# Patient Record
Sex: FEMALE | Race: OTHER | Employment: OTHER | URBAN - METROPOLITAN AREA
[De-identification: names, ages, dates, MRNs, and addresses within clinical notes are randomized per-mention and may not be internally consistent; named-entity substitution may affect disease eponyms.]

---

## 2018-10-24 ENCOUNTER — HOSPITAL ENCOUNTER (OUTPATIENT)
Dept: NUCLEAR MEDICINE | Age: 68
Discharge: HOME | End: 2018-10-26
Payer: MEDICARE

## 2018-10-24 ENCOUNTER — HOSPITAL ENCOUNTER (OUTPATIENT)
Dept: NON INVASIVE DIAGNOSTICS | Age: 68
Discharge: HOME | End: 2018-10-24
Payer: MEDICARE

## 2018-10-24 DIAGNOSIS — R07.2 PRECORDIAL PAIN: ICD-10-CM

## 2018-10-24 LAB
LV EF: 70 %
LVEF MODALITY: NORMAL

## 2018-10-24 PROCEDURE — 93017 CV STRESS TEST TRACING ONLY: CPT

## 2018-10-24 PROCEDURE — 2580000003 HC RX 258: Performed by: FAMILY MEDICINE

## 2018-10-24 PROCEDURE — 2580000003 HC RX 258: Performed by: INTERNAL MEDICINE

## 2018-10-24 PROCEDURE — 3430000000 HC RX DIAGNOSTIC RADIOPHARMACEUTICAL: Performed by: FAMILY MEDICINE

## 2018-10-24 PROCEDURE — A9500 TC99M SESTAMIBI: HCPCS | Performed by: FAMILY MEDICINE

## 2018-10-24 PROCEDURE — 78452 HT MUSCLE IMAGE SPECT MULT: CPT

## 2018-10-24 PROCEDURE — 6360000002 HC RX W HCPCS: Performed by: INTERNAL MEDICINE

## 2018-10-24 RX ORDER — SODIUM CHLORIDE 0.9 % (FLUSH) 0.9 %
10 SYRINGE (ML) INJECTION PRN
Status: DISCONTINUED | OUTPATIENT
Start: 2018-10-24 | End: 2018-10-24

## 2018-10-24 RX ORDER — NITROGLYCERIN 0.4 MG/1
0.4 TABLET SUBLINGUAL EVERY 5 MIN PRN
Status: DISCONTINUED | OUTPATIENT
Start: 2018-10-24 | End: 2018-10-24

## 2018-10-24 RX ORDER — HYDRALAZINE HYDROCHLORIDE 20 MG/ML
10 INJECTION INTRAMUSCULAR; INTRAVENOUS EVERY 6 HOURS PRN
Status: DISCONTINUED | OUTPATIENT
Start: 2018-10-24 | End: 2018-10-27 | Stop reason: HOSPADM

## 2018-10-24 RX ORDER — HYDRALAZINE HYDROCHLORIDE 20 MG/ML
5 INJECTION INTRAMUSCULAR; INTRAVENOUS EVERY 6 HOURS PRN
Status: DISCONTINUED | OUTPATIENT
Start: 2018-10-24 | End: 2018-10-27 | Stop reason: HOSPADM

## 2018-10-24 RX ORDER — AMINOPHYLLINE DIHYDRATE 25 MG/ML
100 INJECTION, SOLUTION INTRAVENOUS
Status: DISCONTINUED | OUTPATIENT
Start: 2018-10-24 | End: 2018-10-24

## 2018-10-24 RX ORDER — AMINOPHYLLINE DIHYDRATE 25 MG/ML
100 INJECTION, SOLUTION INTRAVENOUS
Status: ACTIVE | OUTPATIENT
Start: 2018-10-24 | End: 2018-10-24

## 2018-10-24 RX ORDER — SODIUM CHLORIDE 0.9 % (FLUSH) 0.9 %
10 SYRINGE (ML) INJECTION PRN
Status: ACTIVE | OUTPATIENT
Start: 2018-10-24 | End: 2018-10-25

## 2018-10-24 RX ORDER — METOPROLOL TARTRATE 5 MG/5ML
2.5 INJECTION INTRAVENOUS PRN
Status: ACTIVE | OUTPATIENT
Start: 2018-10-24 | End: 2018-10-25

## 2018-10-24 RX ORDER — 0.9 % SODIUM CHLORIDE 0.9 %
250 INTRAVENOUS SOLUTION INTRAVENOUS ONCE
Status: DISCONTINUED | OUTPATIENT
Start: 2018-10-24 | End: 2018-10-27 | Stop reason: HOSPADM

## 2018-10-24 RX ORDER — NITROGLYCERIN 0.4 MG/1
0.4 TABLET SUBLINGUAL EVERY 5 MIN PRN
Status: ACTIVE | OUTPATIENT
Start: 2018-10-24 | End: 2018-10-25

## 2018-10-24 RX ORDER — METOPROLOL TARTRATE 5 MG/5ML
2.5 INJECTION INTRAVENOUS PRN
Status: DISCONTINUED | OUTPATIENT
Start: 2018-10-24 | End: 2018-10-24

## 2018-10-24 RX ADMIN — Medication 10 ML: at 07:37

## 2018-10-24 RX ADMIN — Medication 10 ML: at 08:53

## 2018-10-24 RX ADMIN — TETRAKIS(2-METHOXYISOBUTYLISOCYANIDE)COPPER(I) TETRAFLUOROBORATE 11.6 MILLICURIE: 1 INJECTION, POWDER, LYOPHILIZED, FOR SOLUTION INTRAVENOUS at 07:37

## 2018-10-24 RX ADMIN — HYDRALAZINE HYDROCHLORIDE 10 MG: 20 INJECTION INTRAMUSCULAR; INTRAVENOUS at 09:13

## 2018-10-24 RX ADMIN — TETRAKIS(2-METHOXYISOBUTYLISOCYANIDE)COPPER(I) TETRAFLUOROBORATE 35 MILLICURIE: 1 INJECTION, POWDER, LYOPHILIZED, FOR SOLUTION INTRAVENOUS at 08:55

## 2018-10-24 RX ADMIN — Medication 10 ML: at 08:52

## 2018-10-24 RX ADMIN — HYDRALAZINE HYDROCHLORIDE 5 MG: 20 INJECTION INTRAMUSCULAR; INTRAVENOUS at 09:17

## 2018-10-24 RX ADMIN — REGADENOSON 0.4 MG: 0.08 INJECTION, SOLUTION INTRAVENOUS at 08:53

## 2018-10-24 RX ADMIN — REGADENOSON 0.4 MG: 0.08 INJECTION, SOLUTION INTRAVENOUS at 08:52

## 2018-10-24 NOTE — PROGRESS NOTES
bp high(see stress report). Dr. Sulma Wells ordered 10 mg hydralazine with an additional 5 mg to be pushed and for pt. To continue to be monitored.  Patient denied any chest pain throughout but did c/o headache which she stated she had before test . BP then came down to acceptable level as ordered per Dr. Sulma Wells

## 2018-10-29 ENCOUNTER — HOSPITAL ENCOUNTER (OUTPATIENT)
Dept: MAMMOGRAPHY | Age: 68
Discharge: HOME | End: 2018-10-31
Payer: MEDICARE

## 2018-10-29 DIAGNOSIS — Z12.31 ENCOUNTER FOR SCREENING MAMMOGRAM FOR BREAST CANCER: ICD-10-CM

## 2018-10-29 PROCEDURE — 77063 BREAST TOMOSYNTHESIS BI: CPT

## 2019-06-14 ENCOUNTER — APPOINTMENT (OUTPATIENT)
Dept: GENERAL RADIOLOGY | Age: 69
DRG: 872 | End: 2019-06-14
Payer: MEDICARE

## 2019-06-14 ENCOUNTER — HOSPITAL ENCOUNTER (INPATIENT)
Age: 69
LOS: 4 days | Discharge: HOME | DRG: 872 | End: 2019-06-18
Attending: EMERGENCY MEDICINE | Admitting: INTERNAL MEDICINE
Payer: MEDICARE

## 2019-06-14 DIAGNOSIS — N30.01 ACUTE CYSTITIS WITH HEMATURIA: ICD-10-CM

## 2019-06-14 DIAGNOSIS — I21.4 NSTEMI (NON-ST ELEVATED MYOCARDIAL INFARCTION) (HCC): ICD-10-CM

## 2019-06-14 DIAGNOSIS — A41.9 SEPSIS, DUE TO UNSPECIFIED ORGANISM: Primary | ICD-10-CM

## 2019-06-14 DIAGNOSIS — E87.6 HYPOKALEMIA: ICD-10-CM

## 2019-06-14 LAB
-: ABNORMAL
ABSOLUTE EOS #: 0 K/UL (ref 0–0.4)
ABSOLUTE IMMATURE GRANULOCYTE: 0.18 K/UL (ref 0–0.3)
ABSOLUTE LYMPH #: 0.18 K/UL (ref 1–4.8)
ABSOLUTE MONO #: 0.18 K/UL (ref 0.1–0.8)
ALBUMIN SERPL-MCNC: 3.7 G/DL (ref 3.5–5.2)
ALBUMIN/GLOBULIN RATIO: 1.2 (ref 1–2.5)
ALLEN TEST: ABNORMAL
ALP BLD-CCNC: 88 U/L (ref 35–104)
ALT SERPL-CCNC: 23 U/L (ref 5–33)
AMORPHOUS: ABNORMAL
ANION GAP SERPL CALCULATED.3IONS-SCNC: 19 MMOL/L (ref 9–17)
ANION GAP: 19 MMOL/L (ref 7–16)
AST SERPL-CCNC: 28 U/L
BACTERIA: ABNORMAL
BASOPHILS # BLD: 0 % (ref 0–2)
BASOPHILS ABSOLUTE: 0 K/UL (ref 0–0.2)
BILIRUB SERPL-MCNC: 0.76 MG/DL (ref 0.3–1.2)
BILIRUBIN DIRECT: 0.29 MG/DL
BILIRUBIN URINE: NEGATIVE
BILIRUBIN, INDIRECT: 0.47 MG/DL (ref 0–1)
BUN BLDV-MCNC: 26 MG/DL (ref 8–23)
BUN/CREAT BLD: ABNORMAL (ref 9–20)
CALCIUM SERPL-MCNC: 8.7 MG/DL (ref 8.6–10.4)
CASTS UA: ABNORMAL /LPF (ref 0–8)
CHLORIDE BLD-SCNC: 104 MMOL/L (ref 98–107)
CO2: 15 MMOL/L (ref 20–31)
COLOR: YELLOW
CREAT SERPL-MCNC: 1.12 MG/DL (ref 0.5–0.9)
CRYSTALS, UA: ABNORMAL /HPF
DIFFERENTIAL TYPE: ABNORMAL
EOSINOPHILS RELATIVE PERCENT: 0 % (ref 1–4)
EPITHELIAL CELLS UA: ABNORMAL /HPF (ref 0–5)
FIO2: ABNORMAL
GFR AFRICAN AMERICAN: 58 ML/MIN
GFR NON-AFRICAN AMERICAN: 46 ML/MIN
GFR NON-AFRICAN AMERICAN: 48 ML/MIN
GFR SERPL CREATININE-BSD FRML MDRD: 55 ML/MIN
GFR SERPL CREATININE-BSD FRML MDRD: ABNORMAL ML/MIN/{1.73_M2}
GLOBULIN: NORMAL G/DL (ref 1.5–3.8)
GLUCOSE BLD-MCNC: 109 MG/DL (ref 65–105)
GLUCOSE BLD-MCNC: 149 MG/DL (ref 74–100)
GLUCOSE BLD-MCNC: 159 MG/DL (ref 70–99)
GLUCOSE URINE: NEGATIVE
HCO3 VENOUS: 16.4 MMOL/L (ref 22–29)
HCT VFR BLD CALC: 41.7 % (ref 36.3–47.1)
HEMOGLOBIN: 13.9 G/DL (ref 11.9–15.1)
IMMATURE GRANULOCYTES: 2 %
INR BLD: 1.1
KETONES, URINE: NEGATIVE
LACTIC ACID, SEPSIS WHOLE BLOOD: 1.7 MMOL/L (ref 0.5–1.9)
LACTIC ACID, SEPSIS WHOLE BLOOD: 2.2 MMOL/L (ref 0.5–1.9)
LACTIC ACID, SEPSIS WHOLE BLOOD: 3.5 MMOL/L (ref 0.5–1.9)
LACTIC ACID, SEPSIS WHOLE BLOOD: 4.5 MMOL/L (ref 0.5–1.9)
LACTIC ACID, SEPSIS: ABNORMAL MMOL/L (ref 0.5–1.9)
LACTIC ACID, SEPSIS: NORMAL MMOL/L (ref 0.5–1.9)
LEUKOCYTE ESTERASE, URINE: ABNORMAL
LYMPHOCYTES # BLD: 2 % (ref 24–44)
MCH RBC QN AUTO: 30.3 PG (ref 25.2–33.5)
MCHC RBC AUTO-ENTMCNC: 33.3 G/DL (ref 28.4–34.8)
MCV RBC AUTO: 90.8 FL (ref 82.6–102.9)
MODE: ABNORMAL
MONOCYTES # BLD: 2 % (ref 1–7)
MORPHOLOGY: ABNORMAL
MUCUS: ABNORMAL
NEGATIVE BASE EXCESS, VEN: 5 (ref 0–2)
NITRITE, URINE: NEGATIVE
NRBC AUTOMATED: 0.2 PER 100 WBC
O2 DEVICE/FLOW/%: ABNORMAL
O2 SAT, VEN: 91 % (ref 60–85)
OTHER OBSERVATIONS UA: ABNORMAL
PARTIAL THROMBOPLASTIN TIME: 26.1 SEC (ref 20.5–30.5)
PATIENT TEMP: ABNORMAL
PCO2, VEN: 22.8 MM HG (ref 41–51)
PDW BLD-RTO: 13.2 % (ref 11.8–14.4)
PH UA: 5.5 (ref 5–8)
PH VENOUS: 7.46 (ref 7.32–7.43)
PLATELET # BLD: 120 K/UL (ref 138–453)
PLATELET ESTIMATE: ABNORMAL
PMV BLD AUTO: 10.1 FL (ref 8.1–13.5)
PO2, VEN: 56.2 MM HG (ref 30–50)
POC CHLORIDE: 106 MMOL/L (ref 98–107)
POC CREATININE: 1.17 MG/DL (ref 0.51–1.19)
POC HEMATOCRIT: 41 % (ref 36–46)
POC HEMOGLOBIN: 13.9 G/DL (ref 12–16)
POC IONIZED CALCIUM: 1.1 MMOL/L (ref 1.15–1.33)
POC LACTIC ACID: 4.1 MMOL/L (ref 0.56–1.39)
POC PCO2 TEMP: ABNORMAL MM HG
POC PH TEMP: ABNORMAL
POC PO2 TEMP: ABNORMAL MM HG
POC POTASSIUM: 2.7 MMOL/L (ref 3.5–4.5)
POC SODIUM: 141 MMOL/L (ref 138–146)
POSITIVE BASE EXCESS, VEN: ABNORMAL (ref 0–3)
POTASSIUM SERPL-SCNC: 2.9 MMOL/L (ref 3.7–5.3)
PROCALCITONIN: >100 NG/ML
PROTEIN UA: ABNORMAL
PROTHROMBIN TIME: 11.8 SEC (ref 9–12)
RBC # BLD: 4.59 M/UL (ref 3.95–5.11)
RBC # BLD: ABNORMAL 10*6/UL
RBC UA: ABNORMAL /HPF (ref 0–4)
RENAL EPITHELIAL, UA: ABNORMAL /HPF
SAMPLE SITE: ABNORMAL
SEG NEUTROPHILS: 94 % (ref 36–66)
SEGMENTED NEUTROPHILS ABSOLUTE COUNT: 8.46 K/UL (ref 1.8–7.7)
SODIUM BLD-SCNC: 138 MMOL/L (ref 135–144)
SPECIFIC GRAVITY UA: 1.01 (ref 1–1.03)
TOTAL CO2, VENOUS: 17 MMOL/L (ref 23–30)
TOTAL PROTEIN: 6.7 G/DL (ref 6.4–8.3)
TRICHOMONAS: ABNORMAL
TROPONIN INTERP: ABNORMAL
TROPONIN INTERP: ABNORMAL
TROPONIN T: ABNORMAL NG/ML
TROPONIN T: ABNORMAL NG/ML
TROPONIN, HIGH SENSITIVITY: 334 NG/L (ref 0–14)
TROPONIN, HIGH SENSITIVITY: 345 NG/L (ref 0–14)
TURBIDITY: ABNORMAL
URINE HGB: ABNORMAL
UROBILINOGEN, URINE: NORMAL
WBC # BLD: 9 K/UL (ref 3.5–11.3)
WBC # BLD: ABNORMAL 10*3/UL
WBC UA: ABNORMAL /HPF (ref 0–5)
YEAST: ABNORMAL

## 2019-06-14 PROCEDURE — 96365 THER/PROPH/DIAG IV INF INIT: CPT

## 2019-06-14 PROCEDURE — 71045 X-RAY EXAM CHEST 1 VIEW: CPT

## 2019-06-14 PROCEDURE — 82565 ASSAY OF CREATININE: CPT

## 2019-06-14 PROCEDURE — 85014 HEMATOCRIT: CPT

## 2019-06-14 PROCEDURE — 87149 DNA/RNA DIRECT PROBE: CPT

## 2019-06-14 PROCEDURE — 93005 ELECTROCARDIOGRAM TRACING: CPT | Performed by: EMERGENCY MEDICINE

## 2019-06-14 PROCEDURE — 87088 URINE BACTERIA CULTURE: CPT

## 2019-06-14 PROCEDURE — 2580000003 HC RX 258: Performed by: EMERGENCY MEDICINE

## 2019-06-14 PROCEDURE — 96367 TX/PROPH/DG ADDL SEQ IV INF: CPT

## 2019-06-14 PROCEDURE — 2060000000 HC ICU INTERMEDIATE R&B

## 2019-06-14 PROCEDURE — 87040 BLOOD CULTURE FOR BACTERIA: CPT

## 2019-06-14 PROCEDURE — 36415 COLL VENOUS BLD VENIPUNCTURE: CPT

## 2019-06-14 PROCEDURE — 83605 ASSAY OF LACTIC ACID: CPT

## 2019-06-14 PROCEDURE — 84145 PROCALCITONIN (PCT): CPT

## 2019-06-14 PROCEDURE — 82435 ASSAY OF BLOOD CHLORIDE: CPT

## 2019-06-14 PROCEDURE — 51702 INSERT TEMP BLADDER CATH: CPT

## 2019-06-14 PROCEDURE — 85025 COMPLETE CBC W/AUTO DIFF WBC: CPT

## 2019-06-14 PROCEDURE — 84295 ASSAY OF SERUM SODIUM: CPT

## 2019-06-14 PROCEDURE — 6360000002 HC RX W HCPCS: Performed by: EMERGENCY MEDICINE

## 2019-06-14 PROCEDURE — 2580000003 HC RX 258: Performed by: INTERNAL MEDICINE

## 2019-06-14 PROCEDURE — 80076 HEPATIC FUNCTION PANEL: CPT

## 2019-06-14 PROCEDURE — 99285 EMERGENCY DEPT VISIT HI MDM: CPT

## 2019-06-14 PROCEDURE — 87186 SC STD MICRODIL/AGAR DIL: CPT

## 2019-06-14 PROCEDURE — 93005 ELECTROCARDIOGRAM TRACING: CPT

## 2019-06-14 PROCEDURE — 85610 PROTHROMBIN TIME: CPT

## 2019-06-14 PROCEDURE — 82947 ASSAY GLUCOSE BLOOD QUANT: CPT

## 2019-06-14 PROCEDURE — 82330 ASSAY OF CALCIUM: CPT

## 2019-06-14 PROCEDURE — 6360000002 HC RX W HCPCS: Performed by: INTERNAL MEDICINE

## 2019-06-14 PROCEDURE — 84132 ASSAY OF SERUM POTASSIUM: CPT

## 2019-06-14 PROCEDURE — 82803 BLOOD GASES ANY COMBINATION: CPT

## 2019-06-14 PROCEDURE — 85730 THROMBOPLASTIN TIME PARTIAL: CPT

## 2019-06-14 PROCEDURE — 6370000000 HC RX 637 (ALT 250 FOR IP): Performed by: EMERGENCY MEDICINE

## 2019-06-14 PROCEDURE — 87205 SMEAR GRAM STAIN: CPT

## 2019-06-14 PROCEDURE — 87086 URINE CULTURE/COLONY COUNT: CPT

## 2019-06-14 PROCEDURE — 80048 BASIC METABOLIC PNL TOTAL CA: CPT

## 2019-06-14 PROCEDURE — 84484 ASSAY OF TROPONIN QUANT: CPT

## 2019-06-14 PROCEDURE — 81001 URINALYSIS AUTO W/SCOPE: CPT

## 2019-06-14 RX ORDER — ACETAMINOPHEN 500 MG
1000 TABLET ORAL ONCE
Status: COMPLETED | OUTPATIENT
Start: 2019-06-14 | End: 2019-06-14

## 2019-06-14 RX ORDER — DEXTROSE MONOHYDRATE 25 G/50ML
12.5 INJECTION, SOLUTION INTRAVENOUS PRN
Status: DISCONTINUED | OUTPATIENT
Start: 2019-06-14 | End: 2019-06-18 | Stop reason: HOSPADM

## 2019-06-14 RX ORDER — SODIUM CHLORIDE 0.9 % (FLUSH) 0.9 %
10 SYRINGE (ML) INJECTION PRN
Status: DISCONTINUED | OUTPATIENT
Start: 2019-06-14 | End: 2019-06-18 | Stop reason: HOSPADM

## 2019-06-14 RX ORDER — SODIUM CHLORIDE 0.9 % (FLUSH) 0.9 %
10 SYRINGE (ML) INJECTION EVERY 12 HOURS SCHEDULED
Status: DISCONTINUED | OUTPATIENT
Start: 2019-06-14 | End: 2019-06-14

## 2019-06-14 RX ORDER — POTASSIUM CHLORIDE 7.45 MG/ML
10 INJECTION INTRAVENOUS ONCE
Status: COMPLETED | OUTPATIENT
Start: 2019-06-14 | End: 2019-06-14

## 2019-06-14 RX ORDER — NICOTINE POLACRILEX 4 MG
15 LOZENGE BUCCAL PRN
Status: DISCONTINUED | OUTPATIENT
Start: 2019-06-14 | End: 2019-06-18 | Stop reason: HOSPADM

## 2019-06-14 RX ORDER — POTASSIUM CHLORIDE 20 MEQ/1
60 TABLET, EXTENDED RELEASE ORAL ONCE
Status: COMPLETED | OUTPATIENT
Start: 2019-06-14 | End: 2019-06-14

## 2019-06-14 RX ORDER — SODIUM CHLORIDE 9 MG/ML
INJECTION, SOLUTION INTRAVENOUS CONTINUOUS
Status: DISCONTINUED | OUTPATIENT
Start: 2019-06-14 | End: 2019-06-16

## 2019-06-14 RX ORDER — ASPIRIN 81 MG/1
81 TABLET ORAL DAILY
COMMUNITY

## 2019-06-14 RX ORDER — NABUMETONE 500 MG/1
500 TABLET, FILM COATED ORAL 2 TIMES DAILY
COMMUNITY

## 2019-06-14 RX ORDER — SODIUM CHLORIDE 0.9 % (FLUSH) 0.9 %
10 SYRINGE (ML) INJECTION EVERY 12 HOURS SCHEDULED
Status: DISCONTINUED | OUTPATIENT
Start: 2019-06-14 | End: 2019-06-18 | Stop reason: HOSPADM

## 2019-06-14 RX ORDER — CHLORHEXIDINE GLUCONATE 0.12 MG/ML
15 RINSE ORAL 2 TIMES DAILY
Status: DISCONTINUED | OUTPATIENT
Start: 2019-06-14 | End: 2019-06-14

## 2019-06-14 RX ORDER — ACETAMINOPHEN 160 MG
TABLET,DISINTEGRATING ORAL
COMMUNITY

## 2019-06-14 RX ORDER — 0.9 % SODIUM CHLORIDE 0.9 %
30 INTRAVENOUS SOLUTION INTRAVENOUS ONCE
Status: COMPLETED | OUTPATIENT
Start: 2019-06-14 | End: 2019-06-14

## 2019-06-14 RX ORDER — PANTOPRAZOLE SODIUM 40 MG/1
40 TABLET, DELAYED RELEASE ORAL DAILY
COMMUNITY

## 2019-06-14 RX ORDER — DEXTROSE MONOHYDRATE 50 MG/ML
100 INJECTION, SOLUTION INTRAVENOUS PRN
Status: DISCONTINUED | OUTPATIENT
Start: 2019-06-14 | End: 2019-06-18 | Stop reason: HOSPADM

## 2019-06-14 RX ORDER — LEVOTHYROXINE SODIUM 0.05 MG/1
50 TABLET ORAL DAILY
COMMUNITY

## 2019-06-14 RX ORDER — LOSARTAN POTASSIUM AND HYDROCHLOROTHIAZIDE 12.5; 1 MG/1; MG/1
1 TABLET ORAL DAILY
COMMUNITY

## 2019-06-14 RX ORDER — SODIUM CHLORIDE 0.9 % (FLUSH) 0.9 %
10 SYRINGE (ML) INJECTION PRN
Status: DISCONTINUED | OUTPATIENT
Start: 2019-06-14 | End: 2019-06-14

## 2019-06-14 RX ORDER — ASPIRIN 81 MG/1
81 TABLET, CHEWABLE ORAL DAILY
Status: DISCONTINUED | OUTPATIENT
Start: 2019-06-14 | End: 2019-06-18 | Stop reason: HOSPADM

## 2019-06-14 RX ORDER — ASPIRIN 81 MG/1
324 TABLET, CHEWABLE ORAL ONCE
Status: COMPLETED | OUTPATIENT
Start: 2019-06-14 | End: 2019-06-14

## 2019-06-14 RX ORDER — OXYBUTYNIN CHLORIDE 10 MG/1
10 TABLET, EXTENDED RELEASE ORAL DAILY
COMMUNITY

## 2019-06-14 RX ADMIN — ASPIRIN 81 MG 324 MG: 81 TABLET ORAL at 18:27

## 2019-06-14 RX ADMIN — ACETAMINOPHEN 1000 MG: 500 TABLET ORAL at 17:04

## 2019-06-14 RX ADMIN — SODIUM CHLORIDE 1770 ML: 9 INJECTION, SOLUTION INTRAVENOUS at 16:36

## 2019-06-14 RX ADMIN — VANCOMYCIN HYDROCHLORIDE 750 MG: 100 INJECTION, POWDER, LYOPHILIZED, FOR SOLUTION INTRAVENOUS at 17:34

## 2019-06-14 RX ADMIN — PIPERACILLIN AND TAZOBACTAM 3.38 G: 3; .375 INJECTION, POWDER, LYOPHILIZED, FOR SOLUTION INTRAVENOUS; PARENTERAL at 16:58

## 2019-06-14 RX ADMIN — POTASSIUM CHLORIDE 10 MEQ: 7.46 INJECTION, SOLUTION INTRAVENOUS at 18:25

## 2019-06-14 RX ADMIN — ENOXAPARIN SODIUM 40 MG: 40 INJECTION, SOLUTION INTRAVENOUS; SUBCUTANEOUS at 21:48

## 2019-06-14 RX ADMIN — SODIUM CHLORIDE, PRESERVATIVE FREE 10 ML: 5 INJECTION INTRAVENOUS at 21:50

## 2019-06-14 RX ADMIN — SODIUM CHLORIDE: 9 INJECTION, SOLUTION INTRAVENOUS at 21:49

## 2019-06-14 RX ADMIN — POTASSIUM CHLORIDE 60 MEQ: 1500 TABLET, EXTENDED RELEASE ORAL at 18:32

## 2019-06-14 RX ADMIN — MAGNESIUM GLUCONATE 500 MG ORAL TABLET 400 MG: 500 TABLET ORAL at 18:28

## 2019-06-14 ASSESSMENT — PAIN SCALES - GENERAL
PAINLEVEL_OUTOF10: 0

## 2019-06-14 ASSESSMENT — ENCOUNTER SYMPTOMS
NAUSEA: 0
COLOR CHANGE: 0
VOMITING: 0
RHINORRHEA: 0
ABDOMINAL PAIN: 1
SHORTNESS OF BREATH: 0
COUGH: 0
WHEEZING: 0

## 2019-06-14 NOTE — ED NOTES
Bed: 14  Expected date:   Expected time:   Means of arrival:   Comments:  Kylah Marques RN  06/14/19 1480

## 2019-06-14 NOTE — ED NOTES
Family arrives to bedside, updated on plan of care. All questions answered.       Zunilda Ruiz RN  06/14/19 4970

## 2019-06-14 NOTE — ED NOTES
Critical troponin of 334 received from lab. Dr. Corine Sun notified.       Burgess Memo RN  06/14/19 6895

## 2019-06-14 NOTE — ED NOTES
Pt resting on cot, alert, oriented, no distress noted, family at bedside. Pt resting comfortably, no distress noted, answering all questions appropriately. Pt and family updated on plan of care, awaiting bed placement, will continue to monitor.       Alexandru Obrien RN  06/14/19 7993

## 2019-06-14 NOTE — PROGRESS NOTES
Pharmacy Note  Vancomycin Consult    Dionte Florez is a 68 y.o. female started on Vancomycin for sepsis; consult received from Dr. Karlee Oneill to manage therapy. Also receiving the following antibiotics: zosyn. There is no problem list on file for this patient. Allergies:  Lyrica [pregabalin]; Vicodin [hydrocodone-acetaminophen]; and Meloxicam     Temp max: 38.4    No results for input(s): BUN in the last 72 hours. Recent Labs     06/14/19  1642   CREATININE 1.17       Recent Labs     06/14/19  1636   WBC PENDING         Intake/Output Summary (Last 24 hours) at 6/14/2019 1700  Last data filed at 6/14/2019 1651  Gross per 24 hour   Intake 1000 ml   Output --   Net 1000 ml       Culture Date      Source                       Results  6/14                    Blood                          Pending  6/14                    Urine                           Pending    Ht Readings from Last 1 Encounters:   06/14/19 5' (1.524 m)        Wt Readings from Last 1 Encounters:   06/14/19 130 lb (59 kg)         Body mass index is 25.39 kg/m². Estimated Creatinine Clearance: 35 mL/min (based on SCr of 1.17 mg/dL). Goal Trough Level: 15-20 mcg/mL    Assessment/Plan:  Will initiate vancomycin 750 mg IV given once in ED, will follow renal function to determine redosing time. Patient's Scr was 0.7 mg/dL. Timing of trough level will be determined based on culture results, renal function, and clinical response. Thank you for the consult. Will continue to follow. Claudean Robin, Pharm. D.

## 2019-06-14 NOTE — ED PROVIDER NOTES
[facility] ED  Emergency Department Encounter  Emergency Medicine Resident     Pt Name: Rolly Parks  MRN: [id number]  [place] 1950  Date ofevaluation: 6/14/19  PCP:  Christopher Quarles MD    [address] [place] [address]       Chief Complaint   Patient presents with    Altered Mental Status    Fever       HISTORY OF PRESENT ILLNESS  (Location/Symptom, Timing/Onset, Context/Setting, Quality, Duration, Modifying Factors, Severity.)      Rolly Parks is a 68 y.o. female who presents with altered mentation. Per family, patient has been feeling unwell today. And into the patient had been increasingly confused throughout the day and has been having a fever. Patient denies any chest pain, nausea, vomiting, or shortness of breath. She is complaining of some mild abdominal pain. She denies any headaches, weakness, numbness, or tingling. She has a past medical history significant for hypertension. PAST MEDICAL / SURGICAL / SOCIAL / FAMILY HISTORY     Past medical history significant for hypertension    Past surgical history significant for left total knee arthroscopy.     Social History     Socioeconomic History    Marital status:      Spouse name: Not on file    Number of children: Not on file    Years of education: Not on file    Highest education level: Not on file   Occupational History    Not on file   Social Needs    Financial resource strain: Not on file    Food insecurity:     Worry: Not on file     Inability: Not on file    Transportation needs:     Medical: Not on file     Non-medical: Not on file   Tobacco Use    Smoking status: Never Smoker    Smokeless tobacco: Never Used   Substance and Sexual Activity    Alcohol use: Not Currently    Drug use: Never    Sexual activity: Not Currently   Lifestyle    Physical activity:     Days per week: Not on file     Minutes per session: Not on file    Stress: Not on file   Relationships    Social connections:     Talks on phone: Not Negative for chest pain and palpitations. Gastrointestinal: Positive for abdominal pain. Negative for nausea and vomiting. Genitourinary: Negative for dysuria and hematuria. Musculoskeletal: Negative for arthralgias and myalgias. Skin: Negative for color change and rash. Neurological: Negative for dizziness, weakness, light-headedness, numbness and headaches. Psychiatric/Behavioral: Positive for confusion. Negative for agitation. PHYSICAL EXAM   (up to 7 for level 4, 8or more for level 5)      INITIAL VITALS:   BP (!) 104/45   Pulse 112   Temp 98.2 °F (36.8 °C) (Oral)   Resp (!) 32   Ht 5' (1.524 m)   Wt 130 lb (59 kg)   SpO2 92%   BMI 25.39 kg/m²     Physical Exam   Constitutional: She is oriented to person, place, and time. She appears well-developed and well-nourished. No distress. HENT:   Head: Normocephalic and atraumatic. Eyes: Conjunctivae are normal.   Neck: Normal range of motion. Neck supple. Cardiovascular: Normal rate, regular rhythm and normal heart sounds. Exam reveals no gallop and no friction rub. No murmur heard. Pulmonary/Chest: Effort normal and breath sounds normal. No respiratory distress. She has no wheezes. She has no rales. Abdominal: Soft. She exhibits no distension. There is no tenderness. There is no guarding. Neurological: She is alert and oriented to person, place, and time. Skin: Skin is warm and dry. Capillary refill takes less than 2 seconds. Psychiatric:   Patient is alert and oriented, but thoughts are delayed and patient is pleasantly confused.         DIFFERENTIAL  DIAGNOSIS     PLAN (LABS / IMAGING / EKG):  Orders Placed This Encounter   Procedures    Culture blood #1    Culture blood #2    Urine Culture    XR CHEST PORTABLE    Lactate, Sepsis    Blood gas, venous    CBC Auto Differential    Basic Metabolic Panel    HEPATIC FUNCTION PANEL    Troponin    Urinalysis with microscopic    Protime-INR    APTT    Hemoglobin and hematocrit, blood    SODIUM (POC)    POTASSIUM (POC)    CHLORIDE (POC)    CALCIUM, IONIC (POC)    Lactate, Sepsis    Troponin    Straight cath    Pharmacy to Dose: Vancomycin    Inpatient consult to Cardiology    Inpatient consult to Hospitalist    Venous Blood Gas, POC    Creatinine W/GFR Point of Care    Lactic Acid, POC    POCT Glucose    Anion Gap (Calc) POC    EKG 12 Lead    Insert peripheral IV    PATIENT STATUS (FROM ED OR OR/PROCEDURAL) Inpatient       MEDICATIONS ORDERED:  Orders Placed This Encounter   Medications    sodium chloride flush 0.9 % injection 10 mL    sodium chloride flush 0.9 % injection 10 mL    0.9 % sodium chloride IV bolus 1,770 mL    piperacillin-tazobactam (ZOSYN) 3.375 g in dextrose 5 % 50 mL IVPB (mini-bag)    acetaminophen (TYLENOL) tablet 1,000 mg    vancomycin (VANCOCIN) 750 mg in dextrose 5 % 250 mL IVPB    vancomycin (VANCOCIN) intermittent dosing (placeholder)    potassium chloride 10 mEq/100 mL IVPB (Peripheral Line)    potassium chloride (KLOR-CON M) extended release tablet 60 mEq    magnesium oxide (MAG-OX) tablet 400 mg    aspirin chewable tablet 324 mg    DISCONTD: chlorhexidine (PERIDEX) 0.12 % solution 15 mL       DDX: Sepsis, CVA, UTI, pneumonia, electrolyte abnormality        DIAGNOSTIC RESULTS / EMERGENCY DEPARTMENT COURSE / MDM     LABS:  Results for orders placed or performed during the hospital encounter of 06/14/19   Lactate, Sepsis   Result Value Ref Range    Lactic Acid, Sepsis NOT REPORTED 0.5 - 1.9 mmol/L    Lactic Acid, Sepsis, Whole Blood 4.5 (H) 0.5 - 1.9 mmol/L   CBC Auto Differential   Result Value Ref Range    WBC 9.0 3.5 - 11.3 k/uL    RBC 4.59 3.95 - 5.11 m/uL    Hemoglobin 13.9 11.9 - 15.1 g/dL    Hematocrit 41.7 36.3 - 47.1 %    MCV 90.8 82.6 - 102.9 fL    MCH 30.3 25.2 - 33.5 pg    MCHC 33.3 28.4 - 34.8 g/dL    RDW 13.2 11.8 - 14.4 %    Platelets 513 (L) 699 - 453 k/uL    MPV 10.1 8.1 - 13.5 fL    NRBC Automated 0.2 (H) 0.0 per 100 WBC    Differential Type NOT REPORTED     WBC Morphology NOT REPORTED     RBC Morphology NOT REPORTED     Platelet Estimate NOT REPORTED     Immature Granulocytes 2 (H) 0 %    Seg Neutrophils 94 (H) 36 - 66 %    Lymphocytes 2 (L) 24 - 44 %    Monocytes 2 1 - 7 %    Eosinophils % 0 (L) 1 - 4 %    Basophils 0 0 - 2 %    Absolute Immature Granulocyte 0.18 0.00 - 0.30 k/uL    Segs Absolute 8.46 (H) 1.8 - 7.7 k/uL    Absolute Lymph # 0.18 (L) 1.0 - 4.8 k/uL    Absolute Mono # 0.18 0.1 - 0.8 k/uL    Absolute Eos # 0.00 0.0 - 0.4 k/uL    Basophils # 0.00 0.0 - 0.2 k/uL    Morphology INCREASED BANDS PRESENT    Basic Metabolic Panel   Result Value Ref Range    Glucose 159 (H) 70 - 99 mg/dL    BUN 26 (H) 8 - 23 mg/dL    CREATININE 1.12 (H) 0.50 - 0.90 mg/dL    Bun/Cre Ratio NOT REPORTED 9 - 20    Calcium 8.7 8.6 - 10.4 mg/dL    Sodium 138 135 - 144 mmol/L    Potassium 2.9 (LL) 3.7 - 5.3 mmol/L    Chloride 104 98 - 107 mmol/L    CO2 15 (L) 20 - 31 mmol/L    Anion Gap 19 (H) 9 - 17 mmol/L    GFR Non-African American 48 (L) >60 mL/min    GFR  58 (L) >60 mL/min    GFR Comment          GFR Staging NOT REPORTED    HEPATIC FUNCTION PANEL   Result Value Ref Range    Alb 3.7 3.5 - 5.2 g/dL    Alkaline Phosphatase 88 35 - 104 U/L    ALT 23 5 - 33 U/L    AST 28 <32 U/L    Total Bilirubin 0.76 0.3 - 1.2 mg/dL    Bilirubin, Direct 0.29 <0.31 mg/dL    Bilirubin, Indirect 0.47 0.00 - 1.00 mg/dL    Total Protein 6.7 6.4 - 8.3 g/dL    Globulin NOT REPORTED 1.5 - 3.8 g/dL    Albumin/Globulin Ratio 1.2 1.0 - 2.5   Troponin   Result Value Ref Range    Troponin, High Sensitivity 334 (HH) 0 - 14 ng/L    Troponin T NOT REPORTED <0.03 ng/mL    Troponin Interp NOT REPORTED    Urinalysis with microscopic   Result Value Ref Range    Color, UA YELLOW YELLOW    Turbidity UA CLOUDY (A) CLEAR    Glucose, Ur NEGATIVE NEGATIVE    Bilirubin Urine NEGATIVE NEGATIVE    Ketones, Urine NEGATIVE NEGATIVE    Specific [place], UA 1.014 1.005 - 1.030    Urine Hgb LARGE (A) NEGATIVE    pH, UA 5.5 5.0 - 8.0    Protein, UA 1+ (A) NEGATIVE    Urobilinogen, Urine Normal Normal    Nitrite, Urine NEGATIVE NEGATIVE    Leukocyte Esterase, Urine MODERATE (A) NEGATIVE    -          WBC, UA 20 TO 50 0 - 5 /HPF    RBC, UA TOO NUMEROUS TO COUNT 0 - 4 /HPF    Casts UA  0 - 8 /LPF     2 TO 5 HYALINE Reference range defined for non-centrifuged specimen. Crystals UA NOT REPORTED None /HPF    Epithelial Cells UA 0 TO 2 0 - 5 /HPF    Renal Epithelial, Urine NOT REPORTED 0 /HPF    Bacteria, UA MANY (A) None    Mucus, UA NOT REPORTED None    Trichomonas, UA NOT REPORTED None    Amorphous, UA NOT REPORTED None    Other Observations UA NOT REPORTED NOT REQ.     Yeast, UA NOT REPORTED None   Protime-INR   Result Value Ref Range    Protime 11.8 9.0 - 12.0 sec    INR 1.1    APTT   Result Value Ref Range    PTT 26.1 20.5 - 30.5 sec   Hemoglobin and hematocrit, blood   Result Value Ref Range    POC Hemoglobin 13.9 12.0 - 16.0 g/dL    POC Hematocrit 41 36 - 46 %   SODIUM (POC)   Result Value Ref Range    POC Sodium 141 138 - 146 mmol/L   POTASSIUM (POC)   Result Value Ref Range    POC Potassium 2.7 (LL) 3.5 - 4.5 mmol/L   CHLORIDE (POC)   Result Value Ref Range    POC Chloride 106 98 - 107 mmol/L   CALCIUM, IONIC (POC)   Result Value Ref Range    POC Ionized Calcium 1.10 (L) 1.15 - 1.33 mmol/L   Lactate, Sepsis   Result Value Ref Range    Lactic Acid, Sepsis NOT REPORTED 0.5 - 1.9 mmol/L    Lactic Acid, Sepsis, Whole Blood 3.5 (H) 0.5 - 1.9 mmol/L   Troponin   Result Value Ref Range    Troponin, High Sensitivity 345 (HH) 0 - 14 ng/L    Troponin T NOT REPORTED <0.03 ng/mL    Troponin Interp NOT REPORTED    Venous Blood Gas, POC   Result Value Ref Range    pH, Jose 7.464 (H) 7.320 - 7.430    pCO2, Jose 22.8 (L) 41.0 - 51.0 mm Hg    pO2, Jose 56.2 (H) 30.0 - 50.0 mm Hg    HCO3, Venous 16.4 (L) 22.0 - 29.0 mmol/L    Total CO2, Venous 17 (L) 23.0 - 30.0 mmol/L    Negative Base Excess, Jose 5 (H) 0.0 - 2.0    Positive Base Excess, Jose NOT REPORTED 0.0 - 3.0    O2 Sat, Jose 91 (H) 60.0 - 85.0 %    O2 Device/Flow/% NOT REPORTED     Yasir Test NOT REPORTED     Sample Site NOT REPORTED     Mode NOT REPORTED     FIO2 NOT REPORTED     Pt Temp NOT REPORTED     POC pH Temp NOT REPORTED     POC pCO2 Temp NOT REPORTED mm Hg    POC pO2 Temp NOT REPORTED mm Hg   Creatinine W/GFR Point of Care   Result Value Ref Range    POC Creatinine 1.17 0.51 - 1.19 mg/dL    GFR Comment 55 (L) >60 mL/min    GFR Non- 46 (L) >60 mL/min    GFR Comment         Lactic Acid, POC   Result Value Ref Range    POC Lactic Acid 4.10 (H) 0.56 - 1.39 mmol/L   POCT Glucose   Result Value Ref Range    POC Glucose 149 (H) 74 - 100 mg/dL   Anion Gap (Calc) POC   Result Value Ref Range    Anion Gap 19 (H) 7 - 16 mmol/L       RADIOLOGY:  Xr Chest Portable    Result Date: 6/14/2019  EXAMINATION: ONE XRAY VIEW OF THE CHEST 6/14/2019 5:11 pm COMPARISON: None. HISTORY: ORDERING SYSTEM PROVIDED HISTORY: AMS TECHNOLOGIST PROVIDED HISTORY: AMS Ordering Physician Provided Reason for Exam: upr FINDINGS: The lungs are without acute focal process. There is no effusion or pneumothorax. The cardiomediastinal silhouette is without acute process. The osseous structures are without acute process. No acute process. EKG  Rate is tachycardic. Rhythm is sinus. Axis is normal.  No conduction delays. There is ST depressions in the inferior and lateral leads. Nonspecific T wave changes are noted. Impression is nonspecific EKG. All EKG's are interpreted by the Emergency Department Physician who either signs or Co-signs this chart in the absence of a cardiologist.    EMERGENCY DEPARTMENT COURSE:  Patient presented emergency department for evaluation of confusion. On initial evaluation, patient was tachycardic, tachypneic, and febrile. There was some concern for sepsis, so broad septic work-up was ordered.   Patient was tachycardic and tachypnea, so EKG was obtained which showed diffuse ST depressions in the inferior lateral leads with no ST elevations. Initial troponin was in the 300s. Cardiology team was consulted who recommended aspirin and trending troponins, but no heparin at this time as her elevated troponins are likely due to demand ischemia. Urinalysis was obtained which showed evidence of a urine infection which is likely the cause of patient's septic-like picture. Patient was given a 30 cc/kg fluid bolus due to an elevated lactate over 4 which improved her tachycardia and mentation. She was also noted to be hypokalemic and was given potassium and magnesium supplementation. Intermittent was consulted who accepted admission. Sepsis Times and Checklist  Vital Signs: BP: (!) 104/45  Pulse: 112  Resp: (!) 32  Temp: 98.2 °F (36.8 °C) SpO2: 92 %  SIRS (>2)   Temp > 38.3C or < 36C   HR > 90   RR > 20   WBC > 12 or < 4 or >10% bands  SIRS (>2) and confirmed or suspected source of infection = Sepsis    Sepsis Identified   Date: 6/14/19  Time: [facility]   Sepsis Orders:   CBC: Yes   CMP: Yes   PT/PTT: Yes   Blood Cultures x2: Yes   Urinalysis and Urine Culture: Yes   Lactate: Yes   Broad Spectrum Antibiotics Given (within 3 hours of sepsis identification, after blood cultures): Yes              IV Crystalloid given: Yes    If lactate >2.0 MUST repeat within 6 hours    Is lactate > 4.0:  Yes  If lactate >4.0 OR hypotension 30ml/kg crystalloid MUST be ordered. Fluids must be completed within 3 hours of sepsis identification. PROCEDURES:  None    Procedures    CONSULTS:  PHARMACY TO DOSE VANCOMYCIN  IP CONSULT TO CARDIOLOGY  IP CONSULT TO HOSPITALIST  PHARMACY TO DOSE VANCOMYCIN    CRITICAL CARE:  None     FINAL IMPRESSION      1. Sepsis, due to unspecified organism ([address]    2. Acute cystitis with hematuria    3. NSTEMI (non-ST elevated myocardial infarction) ([address]    4.  Hypokalemia          DISPOSITION / PLAN DISPOSITION Admitted 06/14/2019 06:08:08 PM      PATIENT REFERRED TO:  Karen Meraz MD  [address] [facility] [address] [facility]  [phone]            DISCHARGE MEDICATIONS:  New Prescriptions    No medications on file       Odell Buckner MD  Emergency Medicine Resident    (Please note that portions of this note were completed witha voice recognition program.  Efforts were made to edit the dictations but occasionally words are mis-transcribed.)     Odell Buckner MD  Resident  06/14/19 6701

## 2019-06-14 NOTE — ED NOTES
Report received from Kendra Oscar.  Awaiting pt to go to floor at [address] [place], [place] [address]Kendra  06/14/19 1926

## 2019-06-14 NOTE — ED PROVIDER NOTES
[facility]     Emergency Department     Faculty Attestation    I performed a history and physical examination of the patient and discussed management with the resident. I have reviewed and agree with the residents findings including all diagnostic interpretations, and treatment plans as written. Any areas of disagreement are noted on the chart. I was personally present for the key portions of any procedures. I have documented in the chart those procedures where I was not present during the key portions. I have reviewed the emergency nurses triage note. I agree with the chief complaint, past medical history, past surgical history, allergies, medications, social and family history as documented unless otherwise noted below. Documentation of the HPI, Physical Exam and Medical Decision Making performed by [email or web address] is based on my personal performance of the HPI, PE and MDM. For Physician Assistant/ Nurse Practitioner cases/documentation I have personally evaluated this patient and have completed at least one if not all key elements of the E/M (history, physical exam, and MDM). Additional findings are as noted. Primary Care Physician: Eber Covarrubias MD    History: This is a 68 y.o. female who presents to the Emergency Department with complaint of not feeling well, patient slightly altered, febrile and tachycardic and hypotensive upon arrival.  Family is not at bedside at this time  Patient is tachycardic. She does answer questions. Does appear slightly confused but is oriented to person, place and time. She is moving all extremities equally  Her lungs are clear bilaterally without any rales, wheezes or rhonchi  Her abdomen is soft, nondistended nontender to palpation all quadrants, without rebound or guarding    Patient appears septic, will give antipyretics, 30 cc/kg fluid bolus, lactate, cover with antibiotics, labs, and admission. UA, CXR    EKG Interpretation    Interpreted by me  EKG shows sinus tachycardia with a ventricular rate of 128, there is ST depressions noted in the inferior and lateral leads, elevation in aVR. No T wave changes, Q waves noted in the inferior leads.       Francisca Rivera D.O, M.P.H  Attending Emergency Medicine Physician         Francisca Rivera DO  06/14/19 1493

## 2019-06-15 PROBLEM — N39.0 UTI (URINARY TRACT INFECTION): Status: ACTIVE | Noted: 2019-06-15

## 2019-06-15 PROBLEM — I21.4 NSTEMI (NON-ST ELEVATED MYOCARDIAL INFARCTION) (HCC): Status: ACTIVE | Noted: 2019-06-15

## 2019-06-15 PROBLEM — E03.9 ACQUIRED HYPOTHYROIDISM: Status: ACTIVE | Noted: 2019-06-15

## 2019-06-15 PROBLEM — A41.9 SEPTICEMIA (HCC): Status: ACTIVE | Noted: 2019-06-15

## 2019-06-15 LAB
ALBUMIN SERPL-MCNC: 3 G/DL (ref 3.5–5.2)
ALBUMIN/GLOBULIN RATIO: 1.1 (ref 1–2.5)
ALP BLD-CCNC: 56 U/L (ref 35–104)
ALT SERPL-CCNC: 16 U/L (ref 5–33)
ANION GAP SERPL CALCULATED.3IONS-SCNC: 8 MMOL/L (ref 9–17)
AST SERPL-CCNC: 18 U/L
BILIRUB SERPL-MCNC: 0.54 MG/DL (ref 0.3–1.2)
BUN BLDV-MCNC: 24 MG/DL (ref 8–23)
BUN/CREAT BLD: ABNORMAL (ref 9–20)
CALCIUM IONIZED: 1.07 MMOL/L (ref 1.13–1.33)
CALCIUM SERPL-MCNC: 7.5 MG/DL (ref 8.6–10.4)
CHLORIDE BLD-SCNC: 116 MMOL/L (ref 98–107)
CO2: 18 MMOL/L (ref 20–31)
CREAT SERPL-MCNC: 0.82 MG/DL (ref 0.5–0.9)
CULTURE: ABNORMAL
EKG ATRIAL RATE: 128 BPM
EKG P AXIS: 52 DEGREES
EKG P-R INTERVAL: 126 MS
EKG Q-T INTERVAL: 336 MS
EKG QRS DURATION: 76 MS
EKG QTC CALCULATION (BAZETT): 490 MS
EKG R AXIS: -14 DEGREES
EKG T AXIS: 60 DEGREES
EKG VENTRICULAR RATE: 128 BPM
GFR AFRICAN AMERICAN: >60 ML/MIN
GFR NON-AFRICAN AMERICAN: >60 ML/MIN
GFR SERPL CREATININE-BSD FRML MDRD: ABNORMAL ML/MIN/{1.73_M2}
GFR SERPL CREATININE-BSD FRML MDRD: ABNORMAL ML/MIN/{1.73_M2}
GLUCOSE BLD-MCNC: 101 MG/DL (ref 65–105)
GLUCOSE BLD-MCNC: 104 MG/DL (ref 65–105)
GLUCOSE BLD-MCNC: 105 MG/DL (ref 65–105)
GLUCOSE BLD-MCNC: 111 MG/DL (ref 70–99)
HCT VFR BLD CALC: 42.8 % (ref 36.3–47.1)
HEMOGLOBIN: 14.6 G/DL (ref 11.9–15.1)
INR BLD: 1.3
LACTIC ACID, SEPSIS WHOLE BLOOD: 1.5 MMOL/L (ref 0.5–1.9)
LACTIC ACID, SEPSIS: NORMAL MMOL/L (ref 0.5–1.9)
Lab: ABNORMAL
MAGNESIUM: 1.6 MG/DL (ref 1.6–2.6)
MCH RBC QN AUTO: 30.3 PG (ref 25.2–33.5)
MCHC RBC AUTO-ENTMCNC: 34.1 G/DL (ref 28.4–34.8)
MCV RBC AUTO: 88.8 FL (ref 82.6–102.9)
NRBC AUTOMATED: 0 PER 100 WBC
PDW BLD-RTO: 13.9 % (ref 11.8–14.4)
PHOSPHORUS: 2.2 MG/DL (ref 2.6–4.5)
PLATELET # BLD: 71 K/UL (ref 138–453)
PMV BLD AUTO: 10.3 FL (ref 8.1–13.5)
POTASSIUM SERPL-SCNC: 3.9 MMOL/L (ref 3.7–5.3)
PROTHROMBIN TIME: 13.3 SEC (ref 9–12)
RBC # BLD: 4.82 M/UL (ref 3.95–5.11)
SODIUM BLD-SCNC: 142 MMOL/L (ref 135–144)
SPECIMEN DESCRIPTION: ABNORMAL
TOTAL PROTEIN: 5.7 G/DL (ref 6.4–8.3)
TROPONIN INTERP: ABNORMAL
TROPONIN INTERP: ABNORMAL
TROPONIN T: ABNORMAL NG/ML
TROPONIN T: ABNORMAL NG/ML
TROPONIN, HIGH SENSITIVITY: 254 NG/L (ref 0–14)
TROPONIN, HIGH SENSITIVITY: 322 NG/L (ref 0–14)
WBC # BLD: 17.4 K/UL (ref 3.5–11.3)

## 2019-06-15 PROCEDURE — 84484 ASSAY OF TROPONIN QUANT: CPT

## 2019-06-15 PROCEDURE — 2580000003 HC RX 258: Performed by: INTERNAL MEDICINE

## 2019-06-15 PROCEDURE — 99222 1ST HOSP IP/OBS MODERATE 55: CPT | Performed by: INTERNAL MEDICINE

## 2019-06-15 PROCEDURE — 83605 ASSAY OF LACTIC ACID: CPT

## 2019-06-15 PROCEDURE — 6370000000 HC RX 637 (ALT 250 FOR IP): Performed by: INTERNAL MEDICINE

## 2019-06-15 PROCEDURE — 6370000000 HC RX 637 (ALT 250 FOR IP): Performed by: NURSE PRACTITIONER

## 2019-06-15 PROCEDURE — 2580000003 HC RX 258: Performed by: NURSE PRACTITIONER

## 2019-06-15 PROCEDURE — 82330 ASSAY OF CALCIUM: CPT

## 2019-06-15 PROCEDURE — 80053 COMPREHEN METABOLIC PANEL: CPT

## 2019-06-15 PROCEDURE — 82947 ASSAY GLUCOSE BLOOD QUANT: CPT

## 2019-06-15 PROCEDURE — 6360000002 HC RX W HCPCS: Performed by: INTERNAL MEDICINE

## 2019-06-15 PROCEDURE — 84100 ASSAY OF PHOSPHORUS: CPT

## 2019-06-15 PROCEDURE — 99223 1ST HOSP IP/OBS HIGH 75: CPT | Performed by: INTERNAL MEDICINE

## 2019-06-15 PROCEDURE — 85610 PROTHROMBIN TIME: CPT

## 2019-06-15 PROCEDURE — 85027 COMPLETE CBC AUTOMATED: CPT

## 2019-06-15 PROCEDURE — 2060000000 HC ICU INTERMEDIATE R&B

## 2019-06-15 PROCEDURE — 36415 COLL VENOUS BLD VENIPUNCTURE: CPT

## 2019-06-15 PROCEDURE — 83735 ASSAY OF MAGNESIUM: CPT

## 2019-06-15 RX ORDER — ACETAMINOPHEN 325 MG/1
650 TABLET ORAL EVERY 4 HOURS PRN
Status: DISCONTINUED | OUTPATIENT
Start: 2019-06-15 | End: 2019-06-17 | Stop reason: SDUPTHER

## 2019-06-15 RX ORDER — 0.9 % SODIUM CHLORIDE 0.9 %
500 INTRAVENOUS SOLUTION INTRAVENOUS ONCE
Status: COMPLETED | OUTPATIENT
Start: 2019-06-15 | End: 2019-06-15

## 2019-06-15 RX ORDER — LACTOBACILLUS RHAMNOSUS GG 10B CELL
1 CAPSULE ORAL
Status: DISCONTINUED | OUTPATIENT
Start: 2019-06-15 | End: 2019-06-18 | Stop reason: HOSPADM

## 2019-06-15 RX ORDER — OXYBUTYNIN CHLORIDE 10 MG/1
10 TABLET, EXTENDED RELEASE ORAL DAILY
Status: DISCONTINUED | OUTPATIENT
Start: 2019-06-15 | End: 2019-06-18 | Stop reason: HOSPADM

## 2019-06-15 RX ORDER — LEVOTHYROXINE SODIUM 0.05 MG/1
50 TABLET ORAL DAILY
Status: DISCONTINUED | OUTPATIENT
Start: 2019-06-15 | End: 2019-06-18 | Stop reason: HOSPADM

## 2019-06-15 RX ORDER — PANTOPRAZOLE SODIUM 40 MG/1
40 TABLET, DELAYED RELEASE ORAL DAILY
Status: DISCONTINUED | OUTPATIENT
Start: 2019-06-15 | End: 2019-06-18 | Stop reason: HOSPADM

## 2019-06-15 RX ORDER — SODIUM CHLORIDE 0.9 % (FLUSH) 0.9 %
10 SYRINGE (ML) INJECTION EVERY 12 HOURS SCHEDULED
Status: DISCONTINUED | OUTPATIENT
Start: 2019-06-15 | End: 2019-06-18 | Stop reason: HOSPADM

## 2019-06-15 RX ADMIN — Medication 1 CAPSULE: at 17:38

## 2019-06-15 RX ADMIN — PIPERACILLIN AND TAZOBACTAM 3.38 G: 3; .375 INJECTION, POWDER, FOR SOLUTION INTRAVENOUS at 09:25

## 2019-06-15 RX ADMIN — ASPIRIN 81 MG: 81 TABLET, CHEWABLE ORAL at 09:35

## 2019-06-15 RX ADMIN — PANTOPRAZOLE SODIUM 40 MG: 40 TABLET, DELAYED RELEASE ORAL at 17:38

## 2019-06-15 RX ADMIN — ACETAMINOPHEN 650 MG: 325 TABLET ORAL at 03:27

## 2019-06-15 RX ADMIN — PIPERACILLIN AND TAZOBACTAM 3.38 G: 3; .375 INJECTION, POWDER, FOR SOLUTION INTRAVENOUS at 00:33

## 2019-06-15 RX ADMIN — SODIUM CHLORIDE 500 ML: 9 INJECTION, SOLUTION INTRAVENOUS at 00:33

## 2019-06-15 RX ADMIN — LEVOTHYROXINE SODIUM 50 MCG: 50 TABLET ORAL at 17:38

## 2019-06-15 RX ADMIN — ACETAMINOPHEN 650 MG: 325 TABLET ORAL at 17:52

## 2019-06-15 RX ADMIN — ENOXAPARIN SODIUM 40 MG: 40 INJECTION, SOLUTION INTRAVENOUS; SUBCUTANEOUS at 09:35

## 2019-06-15 RX ADMIN — ACETAMINOPHEN 650 MG: 325 TABLET ORAL at 12:01

## 2019-06-15 RX ADMIN — SODIUM CHLORIDE, PRESERVATIVE FREE 10 ML: 5 INJECTION INTRAVENOUS at 20:48

## 2019-06-15 RX ADMIN — CEFTRIAXONE SODIUM 2 G: 2 INJECTION, POWDER, FOR SOLUTION INTRAMUSCULAR; INTRAVENOUS at 14:58

## 2019-06-15 RX ADMIN — OXYBUTYNIN CHLORIDE 10 MG: 10 TABLET, EXTENDED RELEASE ORAL at 17:38

## 2019-06-15 RX ADMIN — CALCIUM GLUCONATE 1 G: 98 INJECTION, SOLUTION INTRAVENOUS at 17:48

## 2019-06-15 RX ADMIN — ACETAMINOPHEN 650 MG: 325 TABLET ORAL at 22:03

## 2019-06-15 ASSESSMENT — PAIN DESCRIPTION - DESCRIPTORS: DESCRIPTORS: HEADACHE

## 2019-06-15 ASSESSMENT — PAIN SCALES - GENERAL
PAINLEVEL_OUTOF10: 0
PAINLEVEL_OUTOF10: 7
PAINLEVEL_OUTOF10: 5
PAINLEVEL_OUTOF10: 5
PAINLEVEL_OUTOF10: 3

## 2019-06-15 ASSESSMENT — PAIN DESCRIPTION - PAIN TYPE
TYPE: ACUTE PAIN
TYPE: ACUTE PAIN

## 2019-06-15 ASSESSMENT — PAIN DESCRIPTION - LOCATION
LOCATION: HEAD
LOCATION: HEAD

## 2019-06-15 NOTE — H&P
[address] [facility]    HISTORY AND PHYSICAL EXAMINATION            Date:   6/15/2019  Patient name:  Ivan Robledo  Date of admission:  6/14/2019  4:25 PM  MRN:   [id number]  Account:  [de-identified]  YOB: 1950  PCP:    Nabila Parsons MD  Room:   [facility]300-  Code Status:    Full Code    Chief Complaint:     Chief Complaint   Patient presents with    Altered Mental Status    Fever        History Obtained From:     patient, spouse, electronic medical record    History of Present Illness:     68years old female who was brought to the emergency department because of chills and altered mental status. Per patient and her  patient had chills and fever in the morning. Later on she started feeling tired. Then she started becoming confused. She was brought to the hospital.  She was found to be septic, hypotensive and tachycardic. She was given IV antibiotics and IV fluid and referred for admission. She was continued on antibiotics. Currently the patient feels much better, she is sitting up in the chair, she is alert awake oriented. Denies any fevers. She says she was not having any urinary complaints before presentation. Patient was found to have positive blood and urine culture. Patient also complained of pain in the neck and left shoulder. She had elevated troponins. Cardiology evaluated the patient. Currently denies any other pain. Denies any nausea or vomiting. Past Medical History:     Past Medical History:   Diagnosis Date    Hyperlipidemia     Hypertension     Thyroid disease         Past Surgical History:     Past Surgical History:   Procedure Laterality Date    APPENDECTOMY      KNEE SURGERY Left     SHOULDER SURGERY          Medications Prior to Admission:     Prior to Admission medications    Medication Sig Start Date End Date Taking?  Authorizing Provider   levothyroxine (SYNTHROID) 50 MCG tablet Take 50 mcg by mouth Daily   Yes Historical Provider, MD   losartan-hydrochlorothiazide (HYZAAR) 100-12.5 MG per tablet Take 1 tablet by mouth daily   Yes Historical Provider, MD   Chlorpheniramine-Acetaminophen (CORICIDIN HBP COLD/FLU PO) Take by mouth   Yes Historical Provider, MD   nabumetone (RELAFEN) 500 MG tablet Take 500 mg by mouth 2 times daily   Yes Historical Provider, MD   milnacipran HCl (SAVELLA) 50 MG TABS Take 50 mg by mouth daily   Yes Historical Provider, MD   Cholecalciferol (VITAMIN D3) 2000 units CAPS Take by mouth   Yes Historical Provider, MD   oxybutynin (DITROPAN-XL) 10 MG extended release tablet Take 10 mg by mouth daily   Yes Historical Provider, MD   aspirin 81 MG EC tablet Take 81 mg by mouth daily   Yes Historical Provider, MD   pantoprazole (PROTONIX) 40 MG tablet Take 40 mg by mouth daily   Yes Historical Provider, MD        Allergies:     Lyrica [pregabalin]; Vicodin [hydrocodone-acetaminophen]; and Meloxicam    Social History:     Tobacco:    reports that she has never smoked. She has never used smokeless tobacco.  Alcohol:      reports that she drank alcohol. Occasional  Drug Use:  reports that she does not use drugs. Family History:     History reviewed. No pertinent family history. Review of Systems:     Positive and Negative as described in HPI. CONSTITUTIONAL: Positive for fevers and chills which have resolved  HEENT:  negative for vision, hearing changes, runny nose, throat pain  RESPIRATORY:  negative for shortness of breath, cough, congestion, wheezing.   CARDIOVASCULAR:  negative for chest pain, palpitations  GASTROINTESTINAL:  negative for nausea, vomiting, diarrhea, constipation, change in bowel habits, abdominal pain   GENITOURINARY:  negative for difficulty of urination, burning with urination, frequency   INTEGUMENT:  negative for rash, skin lesions, easy bruising   HEMATOLOGIC/LYMPHATIC:  negative for swelling/edema   ALLERGIC/IMMUNOLOGIC:  negative for urticaria , itching  ENDOCRINE:  negative increase in drinking, increase in urination, hot or cold intolerance  MUSCULOSKELETAL:  negative joint pains, muscle aches, swelling of joints  NEUROLOGICAL: Positive for altered mentation which has resolved  BEHAVIOR/PSYCH:  negative for depression, anxiety    Physical Exam:   BP (!) 106/49   Pulse 92   Temp 98.2 °F (36.8 °C) (Oral)   Resp 24   Ht 5' (1.524 m)   Wt 154 lb 9.6 oz (70.1 kg)   SpO2 96%   BMI 30.19 kg/m²   Temp (24hrs), Av.6 °F (37 °C), Min:98.1 °F (36.7 °C), Max:100.9 °F (38.3 °C)    Recent Labs     19  1642 19  2156 06/15/19  0903 06/15/19  1607   POCGLU 149* 109* 101 105       Intake/Output Summary (Last 24 hours) at 6/15/2019 1632  Last data filed at 6/15/2019 0152  Gross per 24 hour   Intake 1100 ml   Output 300 ml   Net 800 ml       General Appearance:  alert, well appearing, and in no acute distress  Mental status: oriented to person, place, and time with normal affect  Head:  normocephalic, atraumatic. Eye: no icterus, redness, pupils equal and reactive, extraocular eye movements intact, conjunctiva clear  Ear: normal external ear, no discharge, hearing intact  Nose:  no drainage noted  Mouth: mucous membranes moist  Neck: supple, no carotid bruits, thyroid not palpable  Lungs: Bilateral equal air entry, clear to ausculation, no wheezing, rales or rhonchi, normal effort  Cardiovascular: normal rate, regular rhythm, no murmur, gallop, rub.   Abdomen: Soft, nontender, nondistended, normal bowel sounds, no hepatomegaly or splenomegaly  Neurologic: There are no new focal motor or sensory deficits, normal muscle tone and bulk, no abnormal sensation, normal speech, cranial nerves II through XII grossly intact  Skin: No gross lesions, rashes, bruising or bleeding on exposed skin area  Extremities:  peripheral pulses palpable, no pedal edema or calf pain with palpation  Psych: normal affect    Investigations:      Laboratory Testing:  Recent Results (from the past 24 hour(s))   Lactate, Sepsis    Collection Time: 06/14/19  4:36 PM   Result Value Ref Range    Lactic Acid, Sepsis NOT REPORTED 0.5 - 1.9 mmol/L    Lactic Acid, Sepsis, Whole Blood 4.5 (H) 0.5 - 1.9 mmol/L   CBC Auto Differential    Collection Time: 06/14/19  4:36 PM   Result Value Ref Range    WBC 9.0 3.5 - 11.3 k/uL    RBC 4.59 3.95 - 5.11 m/uL    Hemoglobin 13.9 11.9 - 15.1 g/dL    Hematocrit 41.7 36.3 - 47.1 %    MCV 90.8 82.6 - 102.9 fL    MCH 30.3 25.2 - 33.5 pg    MCHC 33.3 28.4 - 34.8 g/dL    RDW 13.2 11.8 - 14.4 %    Platelets 948 (L) 650 - 453 k/uL    MPV 10.1 8.1 - 13.5 fL    NRBC Automated 0.2 (H) 0.0 per 100 WBC    Differential Type NOT REPORTED     WBC Morphology NOT REPORTED     RBC Morphology NOT REPORTED     Platelet Estimate NOT REPORTED     Immature Granulocytes 2 (H) 0 %    Seg Neutrophils 94 (H) 36 - 66 %    Lymphocytes 2 (L) 24 - 44 %    Monocytes 2 1 - 7 %    Eosinophils % 0 (L) 1 - 4 %    Basophils 0 0 - 2 %    Absolute Immature Granulocyte 0.18 0.00 - 0.30 k/uL    Segs Absolute 8.46 (H) 1.8 - 7.7 k/uL    Absolute Lymph # 0.18 (L) 1.0 - 4.8 k/uL    Absolute Mono # 0.18 0.1 - 0.8 k/uL    Absolute Eos # 0.00 0.0 - 0.4 k/uL    Basophils # 0.00 0.0 - 0.2 k/uL    Morphology INCREASED BANDS PRESENT    Basic Metabolic Panel    Collection Time: 06/14/19  4:36 PM   Result Value Ref Range    Glucose 159 (H) 70 - 99 mg/dL    BUN 26 (H) 8 - 23 mg/dL    CREATININE 1.12 (H) 0.50 - 0.90 mg/dL    Bun/Cre Ratio NOT REPORTED 9 - 20    Calcium 8.7 8.6 - 10.4 mg/dL    Sodium 138 135 - 144 mmol/L    Potassium 2.9 (LL) 3.7 - 5.3 mmol/L    Chloride 104 98 - 107 mmol/L    CO2 15 (L) 20 - 31 mmol/L    Anion Gap 19 (H) 9 - 17 mmol/L    GFR Non-African American 48 (L) >60 mL/min    GFR  58 (L) >60 mL/min    GFR Comment          GFR Staging NOT REPORTED    HEPATIC FUNCTION PANEL    Collection Time: 06/14/19  4:36 PM   Result Value Ref Range    Alb 3.7 3.5 - 5.2 g/dL    Alkaline Collection Time: 06/14/19  4:42 PM   Result Value Ref Range    POC Potassium 2.7 (LL) 3.5 - 4.5 mmol/L   CHLORIDE (POC)    Collection Time: 06/14/19  4:42 PM   Result Value Ref Range    POC Chloride 106 98 - 107 mmol/L   CALCIUM, IONIC (POC)    Collection Time: 06/14/19  4:42 PM   Result Value Ref Range    POC Ionized Calcium 1.10 (L) 1.15 - 1.33 mmol/L   Lactic Acid, POC    Collection Time: 06/14/19  4:42 PM   Result Value Ref Range    POC Lactic Acid 4.10 (H) 0.56 - 1.39 mmol/L   POCT Glucose    Collection Time: 06/14/19  4:42 PM   Result Value Ref Range    POC Glucose 149 (H) 74 - 100 mg/dL   Anion Gap (Calc) POC    Collection Time: 06/14/19  4:42 PM   Result Value Ref Range    Anion Gap 19 (H) 7 - 16 mmol/L   Urine Culture    Collection Time: 06/14/19  4:57 PM   Result Value Ref Range    Specimen Description . CLEAN CATCH URINE     Special Requests NOT REPORTED     Culture ESCHERICHIA COLI >[id number] CFU/ML (A)    Urinalysis with microscopic    Collection Time: 06/14/19  4:58 PM   Result Value Ref Range    Color, UA YELLOW YELLOW    Turbidity UA CLOUDY (A) CLEAR    Glucose, Ur NEGATIVE NEGATIVE    Bilirubin Urine NEGATIVE NEGATIVE    Ketones, Urine NEGATIVE NEGATIVE    Specific Gravity, UA 1.014 1.005 - 1.030    Urine Hgb LARGE (A) NEGATIVE    pH, UA 5.5 5.0 - 8.0    Protein, UA 1+ (A) NEGATIVE    Urobilinogen, Urine Normal Normal    Nitrite, Urine NEGATIVE NEGATIVE    Leukocyte Esterase, Urine MODERATE (A) NEGATIVE    -          WBC, UA 20 TO 50 0 - 5 /HPF    RBC, UA TOO NUMEROUS TO COUNT 0 - 4 /HPF    Casts UA  0 - 8 /LPF     2 TO 5 HYALINE Reference range defined for non-centrifuged specimen. Crystals UA NOT REPORTED None /HPF    Epithelial Cells UA 0 TO 2 0 - 5 /HPF    Renal Epithelial, Urine NOT REPORTED 0 /HPF    Bacteria, UA MANY (A) None    Mucus, UA NOT REPORTED None    Trichomonas, UA NOT REPORTED None    Amorphous, UA NOT REPORTED None    Other Observations UA NOT REPORTED NOT REQ.     Yeast, UA NOT REPORTED None   Lactate, Sepsis    Collection Time: 06/14/19  6:20 PM   Result Value Ref Range    Lactic Acid, Sepsis NOT REPORTED 0.5 - 1.9 mmol/L    Lactic Acid, Sepsis, Whole Blood 3.5 (H) 0.5 - 1.9 mmol/L   Troponin    Collection Time: 06/14/19  6:20 PM   Result Value Ref Range    Troponin, High Sensitivity 345 (HH) 0 - 14 ng/L    Troponin T NOT REPORTED <0.03 ng/mL    Troponin Interp NOT REPORTED    Lactate, Sepsis    Collection Time: 06/14/19  9:00 PM   Result Value Ref Range    Lactic Acid, Sepsis NOT REPORTED 0.5 - 1.9 mmol/L    Lactic Acid, Sepsis, Whole Blood 2.2 (H) 0.5 - 1.9 mmol/L   Procalcitonin    Collection Time: 06/14/19  9:00 PM   Result Value Ref Range    Procalcitonin >100.00 (H) <0.09 ng/mL   POC Glucose Fingerstick    Collection Time: 06/14/19  9:56 PM   Result Value Ref Range    POC Glucose 109 (H) 65 - 105 mg/dL   Lactate, Sepsis    Collection Time: 06/14/19 11:25 PM   Result Value Ref Range    Lactic Acid, Sepsis NOT REPORTED 0.5 - 1.9 mmol/L    Lactic Acid, Sepsis, Whole Blood 1.7 0.5 - 1.9 mmol/L   Troponin    Collection Time: 06/14/19 11:25 PM   Result Value Ref Range    Troponin, High Sensitivity 322 (HH) 0 - 14 ng/L    Troponin T NOT REPORTED <0.03 ng/mL    Troponin Interp NOT REPORTED    Troponin    Collection Time: 06/15/19  3:21 AM   Result Value Ref Range    Troponin, High Sensitivity 254 (HH) 0 - 14 ng/L    Troponin T NOT REPORTED <0.03 ng/mL    Troponin Interp NOT REPORTED    Comprehensive Metabolic Panel w/ Reflex to MG    Collection Time: 06/15/19  8:36 AM   Result Value Ref Range    Glucose 111 (H) 70 - 99 mg/dL    BUN 24 (H) 8 - 23 mg/dL    CREATININE 0.82 0.50 - 0.90 mg/dL    Bun/Cre Ratio NOT REPORTED 9 - 20    Calcium 7.5 (L) 8.6 - 10.4 mg/dL    Sodium 142 135 - 144 mmol/L    Potassium 3.9 3.7 - 5.3 mmol/L    Chloride 116 (H) 98 - 107 mmol/L    CO2 18 (L) 20 - 31 mmol/L    Anion Gap 8 (L) 9 - 17 mmol/L    Alkaline Phosphatase 56 35 - 104 U/L    ALT 16 5 - 33 U/L    AST 18 <32 U/L    Total Bilirubin 0.54 0.3 - 1.2 mg/dL    Total Protein 5.7 (L) 6.4 - 8.3 g/dL    Alb 3.0 (L) 3.5 - 5.2 g/dL    Albumin/Globulin Ratio 1.1 1.0 - 2.5    GFR Non-African American >60 >60 mL/min    GFR African American >60 >60 mL/min    GFR Comment          GFR Staging NOT REPORTED    CBC    Collection Time: 06/15/19  8:36 AM   Result Value Ref Range    WBC 17.4 (H) 3.5 - 11.3 k/uL    RBC 4.82 3.95 - 5.11 m/uL    Hemoglobin 14.6 11.9 - 15.1 g/dL    Hematocrit 42.8 36.3 - 47.1 %    MCV 88.8 82.6 - 102.9 fL    MCH 30.3 25.2 - 33.5 pg    MCHC 34.1 28.4 - 34.8 g/dL    RDW 13.9 11.8 - 14.4 %    Platelets 71 (L) 203 - 453 k/uL    MPV 10.3 8.1 - 13.5 fL    NRBC Automated 0.0 0.0 per 100 WBC   Protime-INR    Collection Time: 06/15/19  8:36 AM   Result Value Ref Range    Protime 13.3 (H) 9.0 - 12.0 sec    INR 1.3    Ionized Calcium    Collection Time: 06/15/19  8:36 AM   Result Value Ref Range    Calcium, Ion 1.07 (L) 1.13 - 1.33 mmol/L   Magnesium    Collection Time: 06/15/19  8:36 AM   Result Value Ref Range    Magnesium 1.6 1.6 - 2.6 mg/dL   Phosphorus    Collection Time: 06/15/19  8:36 AM   Result Value Ref Range    Phosphorus 2.2 (L) 2.6 - 4.5 mg/dL   Lactate, Sepsis    Collection Time: 06/15/19  8:36 AM   Result Value Ref Range    Lactic Acid, Sepsis NOT REPORTED 0.5 - 1.9 mmol/L    Lactic Acid, Sepsis, Whole Blood 1.5 0.5 - 1.9 mmol/L   POC Glucose Fingerstick    Collection Time: 06/15/19  9:03 AM   Result Value Ref Range    POC Glucose 101 65 - 105 mg/dL   POC Glucose Fingerstick    Collection Time: 06/15/19  4:07 PM   Result Value Ref Range    POC Glucose 105 65 - 105 mg/dL       Imaging/Diagnostics:    Xr Chest Portable    Result Date: 6/14/2019  No acute process.        Assessment :      Primary Problem  Sepsis [facility])    Active Hospital Problems    Diagnosis Date Noted    Septicemia [facility]) [A41.9] 06/15/2019    UTI (urinary tract infection) [N39.0] 06/15/2019    NSTEMI (non-ST elevated myocardial infarction) ([facility] [I21.4] 06/15/2019    Sepsis ([facility] [A41.9] 06/14/2019       Plan:     Patient status Admit as inpatient in the  Progressive Unit/Step down    1. Doing much better today, blood pressure is improving  2. Continue IV fluids but decrease the rate  3. Blood culture positive for gram-negative rods, patient ID input, change to ceftriaxone  4. Most likely related to UTI  5. Elevated troponins most likely from type II, cardiology following  6. Lactic acidosis has resolved  7. Repeat labs in the morning  8. Patient having diarrhea, add probiotic    Consultations:   IP CONSULT TO CARDIOLOGY  IP CONSULT TO HOSPITALIST  IP CONSULT TO INFECTIOUS DISEASES     Patient is admitted as inpatient status because of co-morbidities listed above, severity of signs and symptoms as outlined, requirement for current medical therapies and most importantly because of direct risk to patient if care not provided in a hospital setting.     Rosalie Mueller MD  6/15/2019  4:32 PM    Copy sent to Dr. Mary Madrigal MD

## 2019-06-15 NOTE — PLAN OF CARE
Problem: Sensory:  Goal: General experience of comfort will improve  Description  General experience of comfort will improve  Outcome: Ongoing     Problem: Urinary Elimination:  Goal: Signs and symptoms of infection will decrease  Description  Signs and symptoms of infection will decrease  Outcome: Ongoing  Goal: Ability to reestablish a normal urinary elimination pattern will improve - after catheter removal  Description  Ability to reestablish a normal urinary elimination pattern will improve  Outcome: Ongoing  Goal: Complications related to the disease process, condition or treatment will be avoided or minimized  Description  Complications related to the disease process, condition or treatment will be avoided or minimized  Outcome: Ongoing     Problem: Pain:  Goal: Pain level will decrease  Description  Pain level will decrease  Outcome: Ongoing  Goal: Control of acute pain  Description  Control of acute pain  Outcome: Ongoing  Goal: Control of chronic pain  Description  Control of chronic pain  Outcome: Ongoing

## 2019-06-15 NOTE — CARE COORDINATION
Case Management Initial Discharge Plan  Lisa Ramirez,             Met with:patient & spouse to discuss discharge plans. Information verified: address, contacts, phone number, , insurance Yes  PCP: Familia Nguyen MD  Date of last visit: 1 month    Insurance Provider: medicare/medical mutual     Discharge Planning    Living Arrangements:  Spouse/Significant Other   Support Systems:  Family Members, Spouse/Significant Other    Home has 2 stories      Patient able to perform ADL's:Independent    Current Services (outpatient & in home) none  DME equipment: none  DME provider: none    Pharmacy: [facility]   Potential Assistance Purchasing Medications:  No  Does patient want to participate in local refill/ meds to beds program?  No    Potential Assistance Needed:  F/u appts    Patient agreeable to home care: not indicated at this time  Freedom of choice provided:  n/a    Prior SNF/Rehab Placement and Facility: none  Agreeable to SNF/Rehab: plan is to return home   Freedom of choice provided: n/a   Evaluation: no    Expected Discharge date:  19  Patient expects to be discharged to:  home  Follow Up Appointment: Best Day/ Time: Wednesday AM    Transportation provider:   Transportation arrangements needed for discharge: No    Readmission Risk              Risk of Unplanned Readmission:        11             Does patient have a readmission risk score greater than 14?: No  If yes, follow-up appointment must be made within 7 days of discharge.      Discharge Plan: return home with            Electronically signed by Kolby Beauchamp RN on 6/15/19 at 10:52 AM

## 2019-06-15 NOTE — CONSULTS
patient, RN, family. I have personally reviewed the past medical history, past surgical history, medications, social history, and family history, and I have updated the database accordingly.   Past Medical History:     Past Medical History:   Diagnosis Date    Hyperlipidemia     Hypertension     Thyroid disease        Past Surgical  History:     Past Surgical History:   Procedure Laterality Date    APPENDECTOMY      KNEE SURGERY Left     SHOULDER SURGERY         Medications:      sodium chloride flush  10 mL Intravenous 2 times per day    sodium chloride flush  10 mL Intravenous 2 times per day    enoxaparin  40 mg Subcutaneous Daily    piperacillin-tazobactam  3.375 g Intravenous Q8H    aspirin  81 mg Oral Daily    insulin lispro  0-12 Units Subcutaneous TID WC    insulin lispro  0-6 Units Subcutaneous Nightly    vancomycin  750 mg Intravenous Q24H    vancomycin (VANCOCIN) intermittent dosing (placeholder)   Other RX Placeholder       Social History:     Social History     Socioeconomic History    Marital status:      Spouse name: Not on file    Number of children: Not on file    Years of education: Not on file    Highest education level: Not on file   Occupational History    Not on file   Social Needs    Financial resource strain: Not on file    Food insecurity:     Worry: Not on file     Inability: Not on file    Transportation needs:     Medical: Not on file     Non-medical: Not on file   Tobacco Use    Smoking status: Never Smoker    Smokeless tobacco: Never Used   Substance and Sexual Activity    Alcohol use: Not Currently    Drug use: Never    Sexual activity: Not Currently   Lifestyle    Physical activity:     Days per week: Not on file     Minutes per session: Not on file    Stress: Not on file   Relationships    Social connections:     Talks on phone: Not on file     Gets together: Not on file     Attends Christian service: Not on file     Active member of club or organization: Not on file     Attends meetings of clubs or organizations: Not on file     Relationship status: Not on file    Intimate partner violence:     Fear of current or ex partner: Not on file     Emotionally abused: Not on file     Physically abused: Not on file     Forced sexual activity: Not on file   Other Topics Concern    Not on file   Social History Narrative    Not on file       Family History:   History reviewed. No pertinent family history. Allergies:   Lyrica [pregabalin]; Vicodin [hydrocodone-acetaminophen]; and Meloxicam     Review of Systems:   Constitutional: No fevers or chills. No systemic complaints, confusion  Head: No headaches  Eyes: No double vision or blurry vision. No conjunctival inflammation. ENT: No sore throat or runny nose. . No hearing loss, tinnitus or vertigo. Cardiovascular: No chest pain or palpitations. No shortness of breath. No CARLTON  Lung: No shortness of breath or cough. No sputum production  Abdomen: No nausea, vomiting, diarrhea. Reported abdominal pain. Jessica Sushma No cramps. Genitourinary: No increased urinary frequency, or dysuria. No hematuria. No suprapubic or CVA pain  Musculoskeletal: No muscle aches or pains. No joint effusions, swelling or deformities  Hematologic: No bleeding or bruising. Neurologic: No headache, weakness, numbness, or tingling. Integument: No rash, no ulcers. Psychiatric: No depression. Endocrine: No polyuria, no polydipsia, no polyphagia. Physical Examination :     Patient Vitals for the past 8 hrs:   BP Temp Temp src Pulse Resp SpO2   06/15/19 1122 (!) 106/49 98.2 °F (36.8 °C) Oral 92 24 96 %   06/15/19 0701 (!) 94/54 98.2 °F (36.8 °C) Oral 90 23 95 %     General Appearance: Awake, alert, and in no apparent distress  Head:  Normocephalic, no trauma  Eyes: Pupils equal, round, reactive to light and accommodation; extraocular movements intact; sclera anicteric; conjunctivae pink. No embolic phenomena.   ENT: Oropharynx clear, without erythema, exudate, or thrush. No tenderness of sinuses. Mouth/throat: mucosa pink and moist. No lesions. Dentition in good repair. Neck:Supple, without lymphadenopathy. Thyroid normal, No bruits. Pulmonary/Chest: Clear to auscultation, without wheezes, rales, or rhonchi. No dullness to percussion. Cardiovascular: Regular rate and rhythm without murmurs, rubs, or gallops. Abdomen: Soft, non tender. Bowel sounds normal. No organomegaly  All four Extremities: No cyanosis, clubbing, edema, or effusions. Neurologic: No gross sensory or motor deficits. pleasntly confused  Skin: Warm and dry with good turgor. Signs of peripheral arterial insufficiency. No ulcerations. No open wounds. Medical Decision Making -Laboratory:   I have independently reviewed/ordered the following labs:    CBC with Differential:   Recent Labs     06/14/19  1636 06/15/19  0836   WBC 9.0 17.4*   HGB 13.9 14.6   HCT 41.7 42.8   * 71*   LYMPHOPCT 2*  --    MONOPCT 2  --      BMP:   Recent Labs     06/14/19  1636 06/14/19  1642 06/15/19  0836     --  142   K 2.9*  --  3.9     --  116*   CO2 15*  --  18*   BUN 26*  --  24*   CREATININE 1.12* 1.17 0.82   MG  --   --  1.6     Hepatic Function Panel:   Recent Labs     06/14/19  1636 06/15/19  0836   PROT 6.7 5.7*   LABALBU 3.7 3.0*   BILIDIR 0.29  --    IBILI 0.47  --    BILITOT 0.76 0.54   ALKPHOS 88 56   ALT 23 16   AST 28 18     No results for input(s): RPR in the last 72 hours. No results for input(s): HIV in the last 72 hours. No results for input(s): BC in the last 72 hours.   Lab Results   Component Value Date    MUCUS NOT REPORTED 06/14/2019    RBC 4.82 06/15/2019    TRICHOMONAS NOT REPORTED 06/14/2019    WBC 17.4 06/15/2019    YEAST NOT REPORTED 06/14/2019    TURBIDITY CLOUDY 06/14/2019     Lab Results   Component Value Date    CREATININE 0.82 06/15/2019    GLUCOSE 111 06/15/2019       Medical Decision Making-Imaging:     EXAMINATION:   ONE XRAY VIEW OF THE CHEST     6/14/2019 5:11 pm       COMPARISON:   None.       HISTORY:   ORDERING SYSTEM PROVIDED HISTORY: AMS   TECHNOLOGIST PROVIDED HISTORY:   AMS   Ordering Physician Provided Reason for Exam: upr       FINDINGS:   The lungs are without acute focal process.  There is no effusion or   pneumothorax. The cardiomediastinal silhouette is without acute process. The   osseous structures are without acute process.           Impression   No acute process. Medical Decision Ryoyng-Qaudcymx-Zyyhl:       Medical Decision Making-Other:     Note: Daily decision making includes:   Review of labs, medications, personal examination of radiologic studies   Review of Infection Control Prevention measures.  Review of Antibiotic Stewardship data, when applicable.  Discussion with nursing, wound care and discharge planning Staff, as applicable   Review of discharge planning   Determination of need for outpatient follow up   Determination of prognosis: Good.  Review of medication administration as ordered.  Review of Moderate to Large amounts of data    Thank you for allowing us to participate in the care of this patient. Please call with questions.     Christine Nguyen MD  Pager: [phone] - Office: [phone]

## 2019-06-15 NOTE — PROGRESS NOTES
Physical Therapy    Facility/Department: [facility]  Initial Assessment    NAME: Antolin Mcintyre  : 1950  MRN: [id number]    Date of Service: 6/15/2019  Pankaj Garrido is a 68 y. o. female who presents with altered mentation.  Per family, patient has been feeling unwell today.  And into the patient had been increasingly confused throughout the day and has been having a fever.  Patient denies any chest pain, nausea, vomiting, or shortness of breath.  She is complaining of some mild abdominal pain. She denies any headaches, weakness, numbness, or tingling.  She has a past medical history significant for hypertension. Discharge Recommendations:    No further therapy required at discharge. PT Equipment Recommendations  Equipment Needed: No    Assessment   Assessment: Pt ambulated 200ft w/ no AD SBA provided for safety. No LOB noted this date. Pt voices no concerns in regards to functional mobility upon discharge. D/C skilled therapy this date. Prognosis: Good  Decision Making: Low Complexity  Patient Education: purpose of PT eval; POC; importance of immobility   No Skilled PT: At baseline function  REQUIRES PT FOLLOW UP: No  Activity Tolerance  Activity Tolerance: Patient Tolerated treatment well       Patient Diagnosis(es): The primary encounter diagnosis was Sepsis, due to unspecified organism ([address] Diagnoses of Acute cystitis with hematuria, NSTEMI (non-ST elevated myocardial infarction) ([address] and Hypokalemia were also pertinent to this visit. has a past medical history of Hyperlipidemia, Hypertension, and Thyroid disease. has a past surgical history that includes knee surgery (Left); Appendectomy; and shoulder surgery.     Restrictions  Restrictions/Precautions  Restrictions/Precautions: Up as Tolerated(Up w/assist)  Required Braces or Orthoses?: No  Vision/Hearing  Vision: Impaired  Vision Exceptions: Wears glasses at all times  Hearing: Within functional limits     Subjective  General  Patient Independent  Scooting: Independent  Transfers  Sit to Stand: Independent  Stand to sit: Independent  Ambulation  Ambulation?: Yes  Ambulation 1  Surface: level tile  Device: No Device  Assistance: Stand by assistance(SBA provided for safety)  Gait Deviations: Slow Huma  Distance: 200ft  Comments: No LOB noted or safety concerns this date.    Stairs/Curb  Stairs?: No(Pt declined trial of stairs this date. )     Balance  Posture: Good  Sitting - Static: Good  Sitting - Dynamic: Good  Standing - Static: Good;-  Standing - Dynamic: Good;-  Comments: Standing balance assessed w/ no AD        Plan   Plan  Plan Comment: D/C skilled PT this date  Safety Devices  Type of devices: Gait belt, Call light within reach, Left in bed  Restraints  Initially in place: No         Therapy Time   Individual Concurrent Group Co-treatment   Time In 1402         Time Out 1436         Minutes 34         Timed Code Treatment Minutes: 25 Minutes       America Dixon, PT

## 2019-06-15 NOTE — PLAN OF CARE
Pt admitted with s/s of infection, urine analysis positive. Pt to received iv abx and ivf. Family updated on plan of care at bedside.

## 2019-06-16 LAB
ABSOLUTE EOS #: 0 K/UL (ref 0–0.4)
ABSOLUTE IMMATURE GRANULOCYTE: 0.19 K/UL (ref 0–0.3)
ABSOLUTE LYMPH #: 2.07 K/UL (ref 1–4.8)
ABSOLUTE MONO #: 0.56 K/UL (ref 0.1–0.8)
ANION GAP SERPL CALCULATED.3IONS-SCNC: 10 MMOL/L (ref 9–17)
BASOPHILS # BLD: 0 % (ref 0–2)
BASOPHILS ABSOLUTE: 0 K/UL (ref 0–0.2)
BUN BLDV-MCNC: 18 MG/DL (ref 8–23)
BUN/CREAT BLD: ABNORMAL (ref 9–20)
CALCIUM SERPL-MCNC: 8.5 MG/DL (ref 8.6–10.4)
CHLORIDE BLD-SCNC: 115 MMOL/L (ref 98–107)
CO2: 18 MMOL/L (ref 20–31)
CREAT SERPL-MCNC: 0.57 MG/DL (ref 0.5–0.9)
CULTURE: ABNORMAL
DIFFERENTIAL TYPE: ABNORMAL
EOSINOPHILS RELATIVE PERCENT: 0 % (ref 1–4)
GFR AFRICAN AMERICAN: >60 ML/MIN
GFR NON-AFRICAN AMERICAN: >60 ML/MIN
GFR SERPL CREATININE-BSD FRML MDRD: ABNORMAL ML/MIN/{1.73_M2}
GFR SERPL CREATININE-BSD FRML MDRD: ABNORMAL ML/MIN/{1.73_M2}
GLUCOSE BLD-MCNC: 72 MG/DL (ref 65–105)
GLUCOSE BLD-MCNC: 81 MG/DL (ref 70–99)
GLUCOSE BLD-MCNC: 82 MG/DL (ref 65–105)
GLUCOSE BLD-MCNC: 91 MG/DL (ref 65–105)
HCT VFR BLD CALC: 37.3 % (ref 36.3–47.1)
HEMOGLOBIN: 11.8 G/DL (ref 11.9–15.1)
IMMATURE GRANULOCYTES: 1 %
LYMPHOCYTES # BLD: 11 % (ref 24–44)
Lab: ABNORMAL
Lab: ABNORMAL
MCH RBC QN AUTO: 30.1 PG (ref 25.2–33.5)
MCHC RBC AUTO-ENTMCNC: 31.6 G/DL (ref 28.4–34.8)
MCV RBC AUTO: 95.2 FL (ref 82.6–102.9)
MONOCYTES # BLD: 3 % (ref 1–7)
MORPHOLOGY: NORMAL
NRBC AUTOMATED: 0 PER 100 WBC
PDW BLD-RTO: 14.4 % (ref 11.8–14.4)
PLATELET # BLD: 84 K/UL (ref 138–453)
PLATELET ESTIMATE: ABNORMAL
PMV BLD AUTO: 11.1 FL (ref 8.1–13.5)
POTASSIUM SERPL-SCNC: 3.6 MMOL/L (ref 3.7–5.3)
RBC # BLD: 3.92 M/UL (ref 3.95–5.11)
RBC # BLD: ABNORMAL 10*6/UL
SEG NEUTROPHILS: 85 % (ref 36–66)
SEGMENTED NEUTROPHILS ABSOLUTE COUNT: 15.98 K/UL (ref 1.8–7.7)
SODIUM BLD-SCNC: 143 MMOL/L (ref 135–144)
SPECIMEN DESCRIPTION: ABNORMAL
SPECIMEN DESCRIPTION: ABNORMAL
WBC # BLD: 18.8 K/UL (ref 3.5–11.3)
WBC # BLD: ABNORMAL 10*3/UL

## 2019-06-16 PROCEDURE — 2580000003 HC RX 258: Performed by: NURSE PRACTITIONER

## 2019-06-16 PROCEDURE — 85025 COMPLETE CBC W/AUTO DIFF WBC: CPT

## 2019-06-16 PROCEDURE — 6360000002 HC RX W HCPCS: Performed by: INTERNAL MEDICINE

## 2019-06-16 PROCEDURE — 99232 SBSQ HOSP IP/OBS MODERATE 35: CPT | Performed by: INTERNAL MEDICINE

## 2019-06-16 PROCEDURE — 2580000003 HC RX 258: Performed by: INTERNAL MEDICINE

## 2019-06-16 PROCEDURE — 80048 BASIC METABOLIC PNL TOTAL CA: CPT

## 2019-06-16 PROCEDURE — 6370000000 HC RX 637 (ALT 250 FOR IP): Performed by: INTERNAL MEDICINE

## 2019-06-16 PROCEDURE — 82947 ASSAY GLUCOSE BLOOD QUANT: CPT

## 2019-06-16 PROCEDURE — 36415 COLL VENOUS BLD VENIPUNCTURE: CPT

## 2019-06-16 PROCEDURE — 2060000000 HC ICU INTERMEDIATE R&B

## 2019-06-16 RX ORDER — CETIRIZINE HYDROCHLORIDE 10 MG/1
10 TABLET ORAL DAILY
Status: DISCONTINUED | OUTPATIENT
Start: 2019-06-16 | End: 2019-06-18 | Stop reason: HOSPADM

## 2019-06-16 RX ORDER — ECHINACEA PURPUREA EXTRACT 125 MG
1 TABLET ORAL 3 TIMES DAILY
Status: DISCONTINUED | OUTPATIENT
Start: 2019-06-16 | End: 2019-06-18 | Stop reason: HOSPADM

## 2019-06-16 RX ORDER — POTASSIUM CHLORIDE 20 MEQ/1
40 TABLET, EXTENDED RELEASE ORAL ONCE
Status: COMPLETED | OUTPATIENT
Start: 2019-06-16 | End: 2019-06-16

## 2019-06-16 RX ORDER — ATORVASTATIN CALCIUM 40 MG/1
40 TABLET, FILM COATED ORAL NIGHTLY
Status: DISCONTINUED | OUTPATIENT
Start: 2019-06-16 | End: 2019-06-18 | Stop reason: HOSPADM

## 2019-06-16 RX ADMIN — Medication 10 ML: at 10:22

## 2019-06-16 RX ADMIN — SODIUM CHLORIDE, PRESERVATIVE FREE 10 ML: 5 INJECTION INTRAVENOUS at 20:06

## 2019-06-16 RX ADMIN — ACETAMINOPHEN 650 MG: 325 TABLET ORAL at 20:18

## 2019-06-16 RX ADMIN — PANTOPRAZOLE SODIUM 40 MG: 40 TABLET, DELAYED RELEASE ORAL at 10:21

## 2019-06-16 RX ADMIN — ACETAMINOPHEN 650 MG: 325 TABLET ORAL at 03:51

## 2019-06-16 RX ADMIN — CETIRIZINE HYDROCHLORIDE 10 MG: 10 TABLET ORAL at 15:48

## 2019-06-16 RX ADMIN — Medication 1 CAPSULE: at 18:18

## 2019-06-16 RX ADMIN — OXYBUTYNIN CHLORIDE 10 MG: 10 TABLET, EXTENDED RELEASE ORAL at 10:22

## 2019-06-16 RX ADMIN — CEFTRIAXONE SODIUM 2 G: 2 INJECTION, POWDER, FOR SOLUTION INTRAMUSCULAR; INTRAVENOUS at 15:48

## 2019-06-16 RX ADMIN — Medication 1 CAPSULE: at 10:21

## 2019-06-16 RX ADMIN — DESMOPRESSIN ACETATE 40 MG: 0.2 TABLET ORAL at 19:51

## 2019-06-16 RX ADMIN — ENOXAPARIN SODIUM 40 MG: 40 INJECTION, SOLUTION INTRAVENOUS; SUBCUTANEOUS at 10:23

## 2019-06-16 RX ADMIN — MILNACIPRAN HYDROCHLORIDE 50 MG: 50 TABLET, FILM COATED ORAL at 10:21

## 2019-06-16 RX ADMIN — POTASSIUM CHLORIDE 40 MEQ: 20 TABLET, EXTENDED RELEASE ORAL at 18:17

## 2019-06-16 RX ADMIN — ASPIRIN 81 MG: 81 TABLET, CHEWABLE ORAL at 10:21

## 2019-06-16 RX ADMIN — SALINE NASAL SPRAY 1 SPRAY: 1.5 SOLUTION NASAL at 15:48

## 2019-06-16 RX ADMIN — LEVOTHYROXINE SODIUM 50 MCG: 50 TABLET ORAL at 05:43

## 2019-06-16 RX ADMIN — ACETAMINOPHEN 650 MG: 325 TABLET ORAL at 10:22

## 2019-06-16 ASSESSMENT — PAIN SCALES - GENERAL: PAINLEVEL_OUTOF10: 3

## 2019-06-16 NOTE — PROGRESS NOTES
[facility] - Progress Note    Today's Date and Time: 6/16/2019, 1:34 PM    Impression :   · Gram negative septicemia 6-14-19 x 2  · E coli. Gram negative UTI 6-14-19  · Altered mental status likely from sepsis-Resolved  · Elevated CRP  likely from sepsis  · Past Hx of influenza and shingles    Recommendations:   · D/C vancomycin and zosyn  · Ceftriaxone 2 gm IV q 24 hr. Stop date 6-21-19  · Once blood culture sensitivities are available will likely switch to po Cipro to complete treatment. Medical Decision Making/Summary/Discussion:6/16/2019     · Patient with acute onset of malaise, abdominal pain and progressive confusion  · Found to have E coli UTI and septicemia  · Elevated CRP likely from septicemia  · She is improving with antibiotics  Infection Control Recommendations   · Dairy Precautions    Antimicrobial Stewardship Recommendations     · Simplification of therapy  · Targeted therapy  Coordination of Outpatient Care:   · Estimated Length of IV antimicrobials:6-21-19  · Patient will need Midline Catheter Insertion: No  · Patient will need PICC line Insertion:No  · Patient will need: Home IV , [place],  SNF,  LTAC:TBD  · Patient will need outpatient wound care:No    Chief complaint/reason for consultation:   · Septicemia  · UTI      History of Present Illness:   Ivan Robledo is a 68y.o.-year-old  female who was initially admitted on 6/14/2019. Patient seen at the request of Melissa Choudhary. INITIAL HISTORY:  Patient presented through ER with complaints of altered mentation. According to her family she had not feeling well during the day and had become more confused as the day progressed. She was brought into ER where abdominal pain, UTI and septicemia were detected. She has past Hx of HTN, HLP and hypothyroidism. No prior Hx of sepsis. Reports influenza and prior shingles.     CURRENT EVALUATION :6/16/2019    Afebrile  VS stable    The patient seen and evaluated at bedside. Patient is better with no new acute issues or concerns today. Feels stronger. Dizziness has subsided. Patient does not have any fevers, chills, cough, shortness of breath, chest pains or palpitations. Labs, X rays reviewed: 6/16/2019    BUN: 26  Cr:1.12    WBC:17.4  Hb:14.6  Plat:     Cultures:  Urine:  · 6-14-19: E coli, cefazolin and cipro sensitive  Blood:  · 6-14-19: E coli x 2      Discussed with patient, RN, family. I have personally reviewed the past medical history, past surgical history, medications, social history, and family history, and I have updated the database accordingly.   Past Medical History:     Past Medical History:   Diagnosis Date    Hyperlipidemia     Hypertension     Thyroid disease        Past Surgical  History:     Past Surgical History:   Procedure Laterality Date    APPENDECTOMY      KNEE SURGERY Left     SHOULDER SURGERY         Medications:      cetirizine  10 mg Oral Daily    sodium chloride  1 spray Each Nostril TID    potassium chloride  40 mEq Oral Once    atorvastatin  40 mg Oral Nightly    sodium chloride flush  10 mL Intravenous 2 times per day    cefTRIAXone (ROCEPHIN) IV  2 g Intravenous Q24H    levothyroxine  50 mcg Oral Daily    milnacipran HCl  50 mg Oral Daily    oxybutynin  10 mg Oral Daily    pantoprazole  40 mg Oral Daily    lactobacillus  1 capsule Oral TID WC    sodium chloride flush  10 mL Intravenous 2 times per day    enoxaparin  40 mg Subcutaneous Daily    aspirin  81 mg Oral Daily    insulin lispro  0-12 Units Subcutaneous TID WC    insulin lispro  0-6 Units Subcutaneous Nightly       Social History:     Social History     Socioeconomic History    Marital status:      Spouse name: Not on file    Number of children: Not on file    Years of education: Not on file    Highest education level: Not on file   Occupational History    Not on file   Social Needs    Financial resource strain: Not on file    Food insecurity:     Worry: Not on file     Inability: Not on file    Transportation needs:     Medical: Not on file     Non-medical: Not on file   Tobacco Use    Smoking status: Never Smoker    Smokeless tobacco: Never Used   Substance and Sexual Activity    Alcohol use: Not Currently    Drug use: Never    Sexual activity: Not Currently   Lifestyle    Physical activity:     Days per week: Not on file     Minutes per session: Not on file    Stress: Not on file   Relationships    Social connections:     Talks on phone: Not on file     Gets together: Not on file     Attends Pent[employer]al service: Not on file     Active member of club or organization: Not on file     Attends meetings of clubs or organizations: Not on file     Relationship status: Not on file    Intimate partner violence:     Fear of current or ex partner: Not on file     Emotionally abused: Not on file     Physically abused: Not on file     Forced sexual activity: Not on file   Other Topics Concern    Not on file   Social History Narrative    Not on file       Family History:   History reviewed. No pertinent family history. Allergies:   Lyrica [pregabalin]; Vicodin [hydrocodone-acetaminophen]; and Meloxicam     Review of Systems:   Constitutional: No fevers or chills. No systemic complaints, confusion  Head: No headaches  Eyes: No double vision or blurry vision. No conjunctival inflammation. ENT: No sore throat or runny nose. . No hearing loss, tinnitus or vertigo. Cardiovascular: No chest pain or palpitations. No shortness of breath. No CARLTON  Lung: No shortness of breath or cough. No sputum production  Abdomen: No nausea, vomiting, diarrhea. Reported abdominal pain. Jonh Jovani No cramps. Genitourinary: No increased urinary frequency, or dysuria. No hematuria. No suprapubic or CVA pain  Musculoskeletal: No muscle aches or pains. No joint effusions, swelling or deformities  Hematologic: No bleeding or bruising.   Neurologic: No headache, weakness, numbness, or tingling. Integument: No rash, no ulcers. Psychiatric: No depression. Endocrine: No polyuria, no polydipsia, no polyphagia. Physical Examination :     Patient Vitals for the past 8 hrs:   BP Temp Temp src Pulse Resp SpO2 Weight   06/16/19 1226 (!) 160/73 97.7 °F (36.5 °C) Oral 83 19 95 % --   06/16/19 0649 (!) 130/57 97.4 °F (36.3 °C) Oral 77 20 94 % --   06/16/19 0554 -- -- -- -- -- -- 152 lb 14.4 oz (69.4 kg)     General Appearance: Awake, alert, and in no apparent distress  Head:  Normocephalic, no trauma  Eyes: Pupils equal, round, reactive to light and accommodation; extraocular movements intact; sclera anicteric; conjunctivae pink. No embolic phenomena. ENT: Oropharynx clear, without erythema, exudate, or thrush. No tenderness of sinuses. Mouth/throat: mucosa pink and moist. No lesions. Dentition in good repair. Neck:Supple, without lymphadenopathy. Thyroid normal, No bruits. Pulmonary/Chest: Clear to auscultation, without wheezes, rales, or rhonchi. No dullness to percussion. Cardiovascular: Regular rate and rhythm without murmurs, rubs, or gallops. Abdomen: Soft, non tender. Bowel sounds normal. No organomegaly  All four Extremities: No cyanosis, clubbing, edema, or effusions. Neurologic: No gross sensory or motor deficits. pleasntly confused  Skin: Warm and dry with good turgor. Signs of peripheral arterial insufficiency. No ulcerations. No open wounds.     Medical Decision Making -Laboratory:   I have independently reviewed/ordered the following labs:    CBC with Differential:   Recent Labs     06/14/19  1636 06/15/19  0836 06/16/19  0630   WBC 9.0 17.4* 18.8*   HGB 13.9 14.6 11.8*   HCT 41.7 42.8 37.3   * 71* 84*   LYMPHOPCT 2*  --  11*   MONOPCT 2  --  3     BMP:   Recent Labs     06/15/19  0836 06/16/19  0630    143   K 3.9 3.6*   * 115*   CO2 18* 18*   BUN 24* 18   CREATININE 0.82 0.57   MG 1.6  --      Hepatic Function Panel:   Recent Labs 06/14/19  1636 06/15/19  0836   PROT 6.7 5.7*   LABALBU 3.7 3.0*   BILIDIR 0.29  --    IBILI 0.47  --    BILITOT 0.76 0.54   ALKPHOS 88 56   ALT 23 16   AST 28 18     No results for input(s): RPR in the last 72 hours. No results for input(s): HIV in the last 72 hours. No results for input(s): BC in the last 72 hours. Lab Results   Component Value Date    MUCUS NOT REPORTED 06/14/2019    RBC 3.92 06/16/2019    TRICHOMONAS NOT REPORTED 06/14/2019    WBC 18.8 06/16/2019    YEAST NOT REPORTED 06/14/2019    TURBIDITY CLOUDY 06/14/2019     Lab Results   Component Value Date    CREATININE 0.57 06/16/2019    GLUCOSE 81 06/16/2019       Medical Decision Making-Imaging:     EXAMINATION:   ONE XRAY VIEW OF THE CHEST       6/14/2019 5:11 pm       COMPARISON:   None.       HISTORY:   ORDERING SYSTEM PROVIDED HISTORY: AMS   TECHNOLOGIST PROVIDED HISTORY:   AMS   Ordering Physician Provided Reason for Exam: upr       FINDINGS:   The lungs are without acute focal process.  There is no effusion or   pneumothorax. The cardiomediastinal silhouette is without acute process. The   osseous structures are without acute process.           Impression   No acute process. Medical Decision Akjfjq-Cnthyikk-[id number]:       Medical Decision Making-Other:     Note: Daily decision making includes:   Review of labs, medications, personal examination of radiologic studies   Review of Infection Control Prevention measures.  Review of Antibiotic Stewardship data, when applicable.  Discussion with nursing, wound care and discharge planning Staff, as applicable   Review of discharge planning   Determination of need for outpatient follow up   Determination of prognosis: Good.  Review of medication administration as ordered.  Review of Moderate to Large amounts of data    Thank you for allowing us to participate in the care of this patient. Please call with questions.     Jayant High MD  Pager: [phone] - Office: [phone]

## 2019-06-16 NOTE — PROGRESS NOTES
[facility]        Date:   6/16/2019  Patient name: Angelina Nash  Date of admission:  6/14/2019  4:25 PM  MRN:   [id number]  YOB: 1950  PCP: Wilmar Campbell MD    Reason for Consult: NSTEMI     Subjective: No new cardiac issues. Hematuria resolved. Physical Exam:   Vitals: BP (!) 130/57   Pulse 77   Temp 97.4 °F (36.3 °C) (Oral)   Resp 20   Ht 5' (1.524 m)   Wt 152 lb 14.4 oz (69.4 kg)   SpO2 94%   BMI 29.86 kg/m²   General appearance: alert and cooperative with exam  HEENT: Head: Normocephalic, no lesions, without obvious abnormality. Neck: no adenopathy, no carotid bruit, no JVD, supple, symmetrical, trachea midline and thyroid not enlarged, symmetric, no tenderness/mass/nodules  Lungs: clear to auscultation bilaterally  Heart: regular rate and rhythm, S1, S2 normal, no murmur, click, rub or gallop  Abdomen: soft, non-tender; bowel sounds normal; no masses,  no organomegaly  Extremities: extremities normal, atraumatic, no cyanosis or edema  Neurologic: Mental status: Alert, oriented, thought content appropriate      Lab work, imaging, nursing, and chart reviewed extensively.     Medications:   Scheduled Meds:   sodium chloride flush  10 mL Intravenous 2 times per day    cefTRIAXone (ROCEPHIN) IV  2 g Intravenous Q24H    levothyroxine  50 mcg Oral Daily    milnacipran HCl  50 mg Oral Daily    oxybutynin  10 mg Oral Daily    pantoprazole  40 mg Oral Daily    lactobacillus  1 capsule Oral TID     sodium chloride flush  10 mL Intravenous 2 times per day    enoxaparin  40 mg Subcutaneous Daily    aspirin  81 mg Oral Daily    insulin lispro  0-12 Units Subcutaneous TID     insulin lispro  0-6 Units Subcutaneous Nightly     Continuous Infusions:   dextrose           Labs:     CBC:   Recent Labs     06/14/19  1636 06/15/19  0836 06/16/19  0630   WBC 9.0 17.4* 18.8*   HGB 13.9 14.6 11.8*   * 71* 84*     BMP:    Recent Labs     06/14/19  1636 06/14/19  1642 06/15/19  0836 06/16/19  0630     --  142 143   K 2.9*  --  3.9 3.6*     --  116* 115*   CO2 15*  --  18* 18*   BUN 26*  --  24* 18   CREATININE 1.12* 1.17 0.82 0.57   GLUCOSE 159*  --  111* 81     Hepatic:   Recent Labs     06/14/19  1636 06/15/19  0836   AST 28 18   ALT 23 16   BILITOT 0.76 0.54   ALKPHOS 88 56     INR:   Recent Labs     06/14/19  1636 06/15/19  0836   INR 1.1 1.3       Other active acute problems:  Patient Active Problem List   Diagnosis    S[address]is [address] [address]    Septicemia ([facility]    UTI (urinary tract infection)    NSTEMI (non-ST elevated myocardial infarction) ([facility]    Acquired hypothyroidism    Hypokalemia         IMPRESSION & Recommendations:        NSTEMI- Elevated troponin. Cath tomorrow. Risks, benefits, alternatives, and details discussed extensively. Accepts and consents. Sepsis with 2/2 positive blood cultures - clear now  Acute cystitis with hematuria  Hypokalemia          Discussed with patient, family, and Nurse. Electronically signed by Anna Perez DO on 6/16/2019 at 10:23 AM    Anna Perez, [id number] [facility]  [email or web address]  [phone]

## 2019-06-16 NOTE — PROGRESS NOTES
Occupational Therapy Not Seen Note    DATE: 2019  Name: Sasha Jolly  : 1950  MRN: [id number]    Patient not available for Occupational Therapy due to:    Pt independent with functional mobility and functional tasks. Pt with no OT acute care needs at this time, will defer OT eval. Pt states pt has been up to bathroom IND, reports no safety or balance concerns at this time. Pt educuated in ability to re-order therapy if functional status changes.       Next Scheduled Treatment: Defer OT evaluation     Electronically signed by JUDIT Morales on 2019 at 10:16 AM

## 2019-06-16 NOTE — PROGRESS NOTES
Rigo Lezama [address]    Progress Note    6/16/2019    1:16 PM    Name:   Sasha Jolly  MRN:     [id number]     Acct:      [de-identified]   Room:   [facility] Day:  2  Admit Date:  6/14/2019  4:25 PM    PCP:   Rohan Beauchamp MD  Code Status:  Full Code    Subjective:     C/C:   Chief Complaint   Patient presents with    Altered Mental Status    Fever     Interval History Status: significantly improved. Much better today, no dizziness, no weakness,    Brief History:     68years old female who was brought to the emergency department because of chills and altered mental status. Per patient and her  patient had chills and fever in the morning. Later on she started feeling tired. Then she started becoming confused. She was brought to the hospital.  She was found to be septic, hypotensive and tachycardic. She was given IV antibiotics and IV fluid and referred for admission. She was continued on antibiotics. Currently the patient feels much better, she is sitting up in the chair, she is alert awake oriented. Denies any fevers. She says she was not having any urinary complaints before presentation. Patient was found to have positive blood and urine culture. Patient also complained of pain in the neck and left shoulder. She had elevated troponins. Cardiology evaluated the patient. Review of Systems:     Constitutional:  negative for chills, fevers, sweats  Respiratory:  negative for cough, dyspnea on exertion, hemoptysis, shortness of breath, wheezing  Cardiovascular:  negative for chest pain, chest pressure/discomfort, lower extremity edema, palpitations  Gastrointestinal:  negative for abdominal pain, constipation, diarrhea, nausea, vomiting  Neurological:  negative for dizziness, headache  Weakness, dizziness have resolved  Medications: Allergies:     Allergies   Allergen Reactions    Lyrica [Pregabalin]     Vicodin [Hydrocodone-Acetaminophen] Itching    Meloxicam Nausea And Vomiting       Current Meds:   Scheduled Meds:    cetirizine  10 mg Oral Daily    sodium chloride  1 spray Each Nostril TID    potassium chloride  40 mEq Oral Once    sodium chloride flush  10 mL Intravenous 2 times per day    cefTRIAXone (ROCEPHIN) IV  2 g Intravenous Q24H    levothyroxine  50 mcg Oral Daily    milnacipran HCl  50 mg Oral Daily    oxybutynin  10 mg Oral Daily    pantoprazole  40 mg Oral Daily    lactobacillus  1 capsule Oral TID WC    sodium chloride flush  10 mL Intravenous 2 times per day    enoxaparin  40 mg Subcutaneous Daily    aspirin  81 mg Oral Daily    insulin lispro  0-12 Units Subcutaneous TID WC    insulin lispro  0-6 Units Subcutaneous Nightly     Continuous Infusions:    dextrose       PRN Meds: acetaminophen, acetaminophen, sodium chloride flush, glucose, dextrose, glucagon (rDNA), dextrose    Data:     Past Medical History:   has a past medical history of Hyperlipidemia, Hypertension, and Thyroid disease. Social History:   reports that she has never smoked. She has never used smokeless tobacco. She reports that she drank alcohol. She reports that she does not use drugs. Family History: History reviewed. No pertinent family history. Vitals:  BP (!) 160/73   Pulse 83   Temp 97.7 °F (36.5 °C) (Oral)   Resp 19   Ht 5' (1.524 m)   Wt 152 lb 14.4 oz (69.4 kg)   SpO2 95%   BMI 29.86 kg/m²   Temp (24hrs), Av.7 °F (36.5 °C), Min:97.4 °F (36.3 °C), Max:97.9 °F (36.6 °C)    Recent Labs     06/15/19  1607 06/15/19  2155 19  0648 19  1228   POCGLU 105 104 72 91       I/O (24Hr):     Intake/Output Summary (Last 24 hours) at 2019 1316  Last data filed at 2019 0555  Gross per 24 hour   Intake 1489 ml   Output 925 ml   Net 564 ml       Labs:    Hematology:  Recent Labs     19  1636 06/15/19  0836 19  0630   WBC 9.0 17.4* 18.8*   RBC 4.59 4.82 3.92*   HGB 13.9 14.6 11.8* and no distress  Mental Status:  oriented to person, place and time and normal affect  Lungs:  clear to auscultation bilaterally, normal effort  Heart:  regular rate and rhythm, no murmur  Abdomen:  soft, nontender, nondistended, normal bowel sounds, no masses, hepatomegaly, splenomegaly  Extremities:  no edema, redness, tenderness in the calves  Skin:  no gross lesions, rashes, induration    Assessment:        Primary Problem  Sepsis [facility])    Active Hospital Problems    Diagnosis Date Noted    Septicemia ([facility] [A41.9] 06/15/2019    UTI (urinary tract infection) [N39.0] 06/15/2019    [facility]STEM[facility] (non-ST elevated [facility] infarction) ([facility] [I21.4] 06/15/2019    Acquired hypothyroidism [E03.9] 06/15/2019    Hypokalemia [E87.6]     Sepsis ([facility] [A41.9] 06/14/2019       Plan:        1. Continue ceftriaxone, follow sensitivities,  2. Stop IV fluids  3. Replace potassium  4.  Plan for cardiac catheterization tomorrow per cardiology, continue aspirin, add statin    Jese Anand MD  6/16/2019  1:16 PM

## 2019-06-16 NOTE — PLAN OF CARE
Problem: Sensory:  Goal: General experience of comfort will improve  Description  General experience of comfort will improve  6/16/2019 0001 by Trace Torres RN  Outcome: Ongoing  6/15/2019 1849 by Frida Oh RN  Outcome: Ongoing     Problem: Urinary Elimination:  Goal: Signs and symptoms of infection will decrease  Description  Signs and symptoms of infection will decrease  6/16/2019 0001 by Trace Torres RN  Outcome: Ongoing  6/15/2019 1849 by Frida Oh RN  Outcome: Ongoing  Goal: Ability to reestablish a normal urinary elimination pattern will improve - after catheter removal  Description  Ability to reestablish a normal urinary elimination pattern will improve  6/16/2019 0001 by Trace Torres RN  Outcome: Ongoing  6/15/2019 1849 by Frida Oh RN  Outcome: Ongoing  Goal: Complications related to the disease process, condition or treatment will be avoided or minimized  Description  Complications related to the disease process, condition or treatment will be avoided or minimized  6/16/2019 0001 by Trace Torres RN  Outcome: Ongoing  6/15/2019 1849 by Frida Oh RN  Outcome: Ongoing     Problem: Pain:  Goal: Pain level will decrease  Description  Pain level will decrease  6/16/2019 0001 by Trace Torres RN  Outcome: Ongoing  6/15/2019 1849 by Frida Oh RN  Outcome: Ongoing  Goal: Control of acute pain  Description  Control of acute pain  6/16/2019 0001 by Trace Torres RN  Outcome: Ongoing  6/15/2019 1849 by Frida Oh RN  Outcome: Ongoing  Goal: Control of chronic pain  Description  Control of chronic pain  6/16/2019 0001 by Trace Torres RN  Outcome: Ongoing  6/15/2019 1849 by Frida Oh RN  Outcome: Ongoing

## 2019-06-17 ENCOUNTER — APPOINTMENT (OUTPATIENT)
Dept: CARDIAC CATH/INVASIVE PROCEDURES | Age: 69
DRG: 872 | End: 2019-06-17
Payer: MEDICARE

## 2019-06-17 LAB
ANION GAP SERPL CALCULATED.3IONS-SCNC: 9 MMOL/L (ref 9–17)
BUN BLDV-MCNC: 12 MG/DL (ref 8–23)
BUN/CREAT BLD: ABNORMAL (ref 9–20)
CALCIUM SERPL-MCNC: 8.8 MG/DL (ref 8.6–10.4)
CHLORIDE BLD-SCNC: 112 MMOL/L (ref 98–107)
CO2: 19 MMOL/L (ref 20–31)
CREAT SERPL-MCNC: 0.55 MG/DL (ref 0.5–0.9)
GFR AFRICAN AMERICAN: >60 ML/MIN
GFR NON-AFRICAN AMERICAN: >60 ML/MIN
GFR SERPL CREATININE-BSD FRML MDRD: ABNORMAL ML/MIN/{1.73_M2}
GFR SERPL CREATININE-BSD FRML MDRD: ABNORMAL ML/MIN/{1.73_M2}
GLUCOSE BLD-MCNC: 85 MG/DL (ref 65–105)
GLUCOSE BLD-MCNC: 91 MG/DL (ref 65–105)
GLUCOSE BLD-MCNC: 94 MG/DL (ref 65–105)
GLUCOSE BLD-MCNC: 95 MG/DL (ref 70–99)
LV EF: 59 %
LVEF MODALITY: NORMAL
POTASSIUM SERPL-SCNC: 4 MMOL/L (ref 3.7–5.3)
SODIUM BLD-SCNC: 140 MMOL/L (ref 135–144)

## 2019-06-17 PROCEDURE — 6370000000 HC RX 637 (ALT 250 FOR IP): Performed by: INTERNAL MEDICINE

## 2019-06-17 PROCEDURE — C1894 INTRO/SHEATH, NON-LASER: HCPCS

## 2019-06-17 PROCEDURE — 36415 COLL VENOUS BLD VENIPUNCTURE: CPT

## 2019-06-17 PROCEDURE — 2709999900 HC NON-CHARGEABLE SUPPLY

## 2019-06-17 PROCEDURE — 6360000002 HC RX W HCPCS: Performed by: INTERNAL MEDICINE

## 2019-06-17 PROCEDURE — 2580000003 HC RX 258: Performed by: NURSE PRACTITIONER

## 2019-06-17 PROCEDURE — 93306 TTE W/DOPPLER COMPLETE: CPT

## 2019-06-17 PROCEDURE — C1769 GUIDE WIRE: HCPCS

## 2019-06-17 PROCEDURE — C1725 CATH, TRANSLUMIN NON-LASER: HCPCS

## 2019-06-17 PROCEDURE — 99232 SBSQ HOSP IP/OBS MODERATE 35: CPT | Performed by: INTERNAL MEDICINE

## 2019-06-17 PROCEDURE — B2111ZZ FLUOROSCOPY OF MULTIPLE CORONARY ARTERIES USING LOW OSMOLAR CONTRAST: ICD-10-PCS | Performed by: INTERNAL MEDICINE

## 2019-06-17 PROCEDURE — 80048 BASIC METABOLIC PNL TOTAL CA: CPT

## 2019-06-17 PROCEDURE — 2580000003 HC RX 258: Performed by: INTERNAL MEDICINE

## 2019-06-17 PROCEDURE — 6360000002 HC RX W HCPCS

## 2019-06-17 PROCEDURE — 82947 ASSAY GLUCOSE BLOOD QUANT: CPT

## 2019-06-17 PROCEDURE — 2060000000 HC ICU INTERMEDIATE R&B

## 2019-06-17 PROCEDURE — 2500000003 HC RX 250 WO HCPCS

## 2019-06-17 PROCEDURE — 93454 CORONARY ARTERY ANGIO S&I: CPT | Performed by: INTERNAL MEDICINE

## 2019-06-17 PROCEDURE — 4A023N7 MEASUREMENT OF CARDIAC SAMPLING AND PRESSURE, LEFT HEART, PERCUTANEOUS APPROACH: ICD-10-PCS | Performed by: INTERNAL MEDICINE

## 2019-06-17 PROCEDURE — B2151ZZ FLUOROSCOPY OF LEFT HEART USING LOW OSMOLAR CONTRAST: ICD-10-PCS | Performed by: INTERNAL MEDICINE

## 2019-06-17 PROCEDURE — 6360000004 HC RX CONTRAST MEDICATION

## 2019-06-17 RX ORDER — ONDANSETRON 2 MG/ML
4 INJECTION INTRAMUSCULAR; INTRAVENOUS EVERY 6 HOURS PRN
Status: DISCONTINUED | OUTPATIENT
Start: 2019-06-17 | End: 2019-06-18 | Stop reason: HOSPADM

## 2019-06-17 RX ORDER — SODIUM CHLORIDE 9 MG/ML
INJECTION, SOLUTION INTRAVENOUS CONTINUOUS
Status: DISCONTINUED | OUTPATIENT
Start: 2019-06-17 | End: 2019-06-18 | Stop reason: HOSPADM

## 2019-06-17 RX ORDER — SODIUM CHLORIDE 0.9 % (FLUSH) 0.9 %
10 SYRINGE (ML) INJECTION EVERY 12 HOURS SCHEDULED
Status: DISCONTINUED | OUTPATIENT
Start: 2019-06-17 | End: 2019-06-18 | Stop reason: HOSPADM

## 2019-06-17 RX ORDER — SODIUM CHLORIDE 0.9 % (FLUSH) 0.9 %
10 SYRINGE (ML) INJECTION PRN
Status: DISCONTINUED | OUTPATIENT
Start: 2019-06-17 | End: 2019-06-18 | Stop reason: HOSPADM

## 2019-06-17 RX ORDER — ACETAMINOPHEN 325 MG/1
650 TABLET ORAL EVERY 4 HOURS PRN
Status: DISCONTINUED | OUTPATIENT
Start: 2019-06-17 | End: 2019-06-18 | Stop reason: HOSPADM

## 2019-06-17 RX ADMIN — DESMOPRESSIN ACETATE 40 MG: 0.2 TABLET ORAL at 21:12

## 2019-06-17 RX ADMIN — CEFTRIAXONE SODIUM 2 G: 2 INJECTION, POWDER, FOR SOLUTION INTRAMUSCULAR; INTRAVENOUS at 12:30

## 2019-06-17 RX ADMIN — Medication 1 CAPSULE: at 08:25

## 2019-06-17 RX ADMIN — Medication 10 ML: at 08:24

## 2019-06-17 RX ADMIN — OXYBUTYNIN CHLORIDE 10 MG: 10 TABLET, EXTENDED RELEASE ORAL at 08:24

## 2019-06-17 RX ADMIN — SODIUM CHLORIDE: 9 INJECTION, SOLUTION INTRAVENOUS at 16:23

## 2019-06-17 RX ADMIN — Medication 1 CAPSULE: at 21:15

## 2019-06-17 RX ADMIN — LEVOTHYROXINE SODIUM 50 MCG: 50 TABLET ORAL at 08:24

## 2019-06-17 RX ADMIN — ASPIRIN 81 MG: 81 TABLET, CHEWABLE ORAL at 08:25

## 2019-06-17 RX ADMIN — SODIUM CHLORIDE: 9 INJECTION, SOLUTION INTRAVENOUS at 21:13

## 2019-06-17 RX ADMIN — PANTOPRAZOLE SODIUM 40 MG: 40 TABLET, DELAYED RELEASE ORAL at 08:24

## 2019-06-17 RX ADMIN — CETIRIZINE HYDROCHLORIDE 10 MG: 10 TABLET ORAL at 08:25

## 2019-06-17 NOTE — PROGRESS NOTES
[facility]  Progress Note                   Date:   6/17/2019  Patient name: Angelia Dean  Date of admission:  6/14/2019  4:25 PM  MRN:   [id number]  YOB: 1950  PCP: Lyubov Henson MD    Reason for Admission: Sepsis [facility]) [A41.9]    Subjective:       Clinical Changes /Abnormalities: Seen & examined in room with family at bedside. Denies CP or SOB. NPO for cath today. Tele reviewed - SR. Denies fever/chills. Review of Systems    Medications:   Scheduled Meds:   cetirizine  10 mg Oral Daily    sodium chloride  1 spray Each Nostril TID    atorvastatin  40 mg Oral Nightly    sodium chloride flush  10 mL Intravenous 2 times per day    cefTRIAXone (ROCEPHIN) IV  2 g Intravenous Q24H    levothyroxine  50 mcg Oral Daily    milnacipran HCl  50 mg Oral Daily    oxybutynin  10 mg Oral Daily    pantoprazole  40 mg Oral Daily    lactobacillus  1 capsule Oral TID WC    sodium chloride flush  10 mL Intravenous 2 times per day    enoxaparin  40 mg Subcutaneous Daily    aspirin  81 mg Oral Daily    insulin lispro  0-12 Units Subcutaneous TID WC    insulin lispro  0-6 Units Subcutaneous Nightly     Continuous Infusions:   dextrose       CBC:   Recent Labs     06/14/19  1636 06/15/19  0836 06/16/19  0630   WBC 9.0 17.4* 18.8*   HGB 13.9 14.6 11.8*   * 71* 84*     BMP:    Recent Labs     06/15/19  0836 06/16/19  0630 06/17/19  0555    143 140   K 3.9 3.6* 4.0   * 115* 112*   CO2 18* 18* 19*   BUN 24* 18 12   CREATININE 0.82 0.57 0.55   GLUCOSE 111* 81 95     Hepatic:  Recent Labs     06/14/19  1636 06/15/19  0836   AST 28 18   ALT 23 16   BILITOT 0.76 0.54   ALKPHOS 88 56     Troponin:   Recent Labs     06/14/19  1820 06/14/19  2325 06/15/19  0321   TROPHS 345* 322* 254*     BNP: No results for input(s): BNP in the last 72 hours. Lipids: No results for input(s): CHOL, HDL in the last 72 hours.     Invalid input(s): LDLCALCU  INR:   Recent Labs     06/14/19  6211 06/15/19  0836   INR 1.1 1.3       Objective:   Vitals: BP (!) 157/73   Pulse 79   Temp 98.4 °F (36.9 °C) (Oral)   Resp 22   Ht 5' (1.524 m)   Wt 150 lb 4.8 oz (68.2 kg)   SpO2 96%   BMI 29.35 kg/m²   General appearance: alert and cooperative with exam  HEENT: Head: Normocephalic, no lesions, without obvious abnormality. Neck:no JVD, trachea midline, no adenopathy  Lungs: Clear to auscultation  Heart: Regular rate and rhythm, s1/s2 auscultated, no murmurs  Abdomen: soft, non-tender, bowel sounds active  Extremities: no edema  Neurologic: not done        Assessment / Acute Cardiac Problems:   1. NSTEMI  2. Urosepsis - resolving  3. Hypokalemia - resoloved      Patient Active Problem List:     Sepsis ([address]     Septicemia ([address]     UTI (urinary tract infection)     NSTEMI (non-ST elevated myocardial infarction) ([address]     Acquired hypothyroidism     Hypokalemia     Septicemia due to E. coli ([address]      Plan of Treatment:   1. Discussed with patient and family cardiac cath. Questions/concerns addressed. Continue present medications and NPO status. Further POC pending cath findings.      Electronically signed by LAURA Da Silva CNP on 6/17/2019 at [address] [place] [address].  [phone]

## 2019-06-17 NOTE — PROGRESS NOTES
Rigo Lezama [address]    Progress Note    6/17/2019    3:02 PM    Name:   Thomas Lee  MRN:     [id number]     [place]:      [de-identified]   Room:   [address] [place] [address] Day:  3  Admit Date:  6/14/2019  4:25 PM    PCP:   Rosemary Tomlinson MD  Code Status:  Full Code    Subjective:     C/C:   Chief Complaint   Patient presents with    Altered Mental Status    Fever     Interval History Status: significantly improved. Denies chest pain or shortness of breath or any dizziness, continues to improve    Brief History:     68years old female who was brought to the emergency department because of chills and altered mental status. Per patient and her  patient had chills and fever in the morning. Later on she started feeling tired. Then she started becoming confused. She was brought to the hospital.  She was found to be septic, hypotensive and tachycardic. She was given IV antibiotics and IV fluid and referred for admission. She was continued on antibiotics. Currently the patient feels much better, she is sitting up in the chair, she is alert awake oriented. Denies any fevers. She says she was not having any urinary complaints before presentation. Patient was found to have positive blood and urine culture. Patient also complained of pain in the neck and left shoulder. She had elevated troponins. Cardiology evaluated the patient. Blood cultures and urine culture was positive for E. coli. She was on ceftriaxone. ID recommends changing to ciprofloxacin on discharge.     Review of Systems:     Constitutional:  negative for chills, fevers, sweats  Respiratory:  negative for cough, dyspnea on exertion, hemoptysis, shortness of breath, wheezing  Cardiovascular:  negative for chest pain, chest pressure/discomfort, lower extremity edema, palpitations  Gastrointestinal:  negative for abdominal pain, constipation, diarrhea, nausea, vomiting  Neurological: negative for dizziness, headache  Weakness, dizziness have resolved  Medications: Allergies: Allergies   Allergen Reactions    Lyrica [Pregabalin]     Vicodin [Hydrocodone-Acetaminophen] Itching    Meloxicam Nausea And Vomiting       Current Meds:   Scheduled Meds:    cetirizine  10 mg Oral Daily    sodium chloride  1 spray Each Nostril TID    atorvastatin  40 mg Oral Nightly    sodium chloride flush  10 mL Intravenous 2 times per day    cefTRIAXone (ROCEPHIN) IV  2 g Intravenous Q24H    levothyroxine  50 mcg Oral Daily    milnacipran HCl  50 mg Oral Daily    oxybutynin  10 mg Oral Daily    pantoprazole  40 mg Oral Daily    lactobacillus  1 capsule Oral TID WC    sodium chloride flush  10 mL Intravenous 2 times per day    enoxaparin  40 mg Subcutaneous Daily    aspirin  81 mg Oral Daily    insulin lispro  0-12 Units Subcutaneous TID WC    insulin lispro  0-6 Units Subcutaneous Nightly     Continuous Infusions:    dextrose       PRN Meds: acetaminophen, acetaminophen, sodium chloride flush, glucose, dextrose, glucagon (rDNA), dextrose    Data:     Past Medical History:   has a past medical history of Hyperlipidemia, Hypertension, and Thyroid disease. Social History:   reports that she has never smoked. She has never used smokeless tobacco. She reports that she drank alcohol. She reports that she does not use drugs. Family History: History reviewed. No pertinent family history. Vitals:  BP (!) 156/73   Pulse 78   Temp 98.1 °F (36.7 °C) (Oral)   Resp 19   Ht 5' (1.524 m)   Wt 150 lb 4.8 oz (68.2 kg)   SpO2 95%   BMI 29.35 kg/m²   Temp (24hrs), Av.3 °F (36.8 °C), Min:98.1 °F (36.7 °C), Max:98.6 °F (37 °C)    Recent Labs     19  1228 19  1959 19  0706 19  1151   POCGLU 91 82 91 94       I/O (24Hr):     Intake/Output Summary (Last 24 hours) at 2019 1502  Last data filed at 2019 3322  Gross per 24 hour   Intake --   Output 1000 ml   Net -1000 ml       Labs:    Hematology:  Recent Labs     06/14/19  1636 06/15/19  0836 06/16/19  0630   WBC 9.0 17.4* 18.8*   RBC 4.59 4.82 3.92*   HGB 13.9 14.6 11.8*   HCT 41.7 42.8 37.3   MCV 90.8 88.8 95.2   MCH 30.3 30.3 30.1   MCHC 33.3 34.1 31.6   RDW 13.2 13.9 14.4   * 71* 84*   MPV 10.1 10.3 11.1   INR 1.1 1.3  --      Chemistry:  Recent Labs     06/14/19  1820 06/14/19  2325 06/15/19  0321 06/15/19  0836 06/16/19  0630 06/17/19  0555   NA  --   --   --  142 143 140   K  --   --   --  3.9 3.6* 4.0   CL  --   --   --  116* 115* 112*   CO2  --   --   --  18* 18* 19*   GLUCOSE  --   --   --  111* 81 95   BUN  --   --   --  24* 18 12   CREATININE  --   --   --  0.82 0.57 0.55   MG  --   --   --  1.6  --   --    ANIONGAP  --   --   --  8* 10 9   LABGLOM  --   --   --  >60 >60 >60   GFRAA  --   --   --  >60 >60 >60   CALCIUM  --   --   --  7.5* 8.5* 8.8   CAION  --   --   --  1.07*  --   --    PHOS  --   --   --  2.2*  --   --    TROPHS 345* 322* 254*  --   --   --      Recent Labs     06/14/19  1636  06/15/19  0836  06/15/19  2155 06/16/19  0648 06/16/19  1228 06/16/19  1959 06/17/19  0706 06/17/19  1151   PROT 6.7  --  5.7*  --   --   --   --   --   --   --    LABALBU 3.7  --  3.0*  --   --   --   --   --   --   --    AST 28  --  18  --   --   --   --   --   --   --    ALT 23  --  16  --   --   --   --   --   --   --    ALKPHOS 88  --  56  --   --   --   --   --   --   --    BILITOT 0.76  --  0.54  --   --   --   --   --   --   --    BILIDIR 0.29  --   --   --   --   --   --   --   --   --    POCGLU  --    < >  --    < > 104 72 91 82 91 94    < > = values in this interval not displayed.          Lab Results   Component Value Date/Time    SPECIAL NOT REPORTED 06/14/2019 04:57 PM     Lab Results   Component Value Date/Time    CULTURE ESCHERICHIA COLI >145062 CFU/ML (A) 06/14/2019 04:57 PM       Lab Results   Component Value Date    FIO2 NOT REPORTED 06/14/2019       Radiology:    Xr Chest Portable    Result Date: 6/14/2019  No acute process. Physical Examination:        General appearance:  alert, cooperative and no distress  Mental Status:  oriented to person, place and time and normal affect  Lungs:  clear to auscultation bilaterally, normal effort  Heart:  regular rate and rhythm, no murmur  Abdomen:  soft, nontender, nondistended, normal bowel sounds, no masses, hepatomegaly, splenomegaly  Extremities:  no edema, redness, tenderness in the calves  Skin:  no gross lesions, rashes, induration    Assessment:        Primary Problem  Sepsis [facility])    Active Hospital Problems    Diagnosis Date Noted    Septicemia due to E. coli ([facility] [A41.51]     Septicemia ([facility] [A41.9] 06/15/2019    UTI (urinary tract infection) [N39.0] 06/15/2019    NSTEMI (non-ST elevated myocardial infarction) ([facility] [I21.4] 06/15/2019    Acquired hypothyroidism [E03.9] 06/15/2019    Hypokalemia [E87.6]     Sepsis ([facility] [A41.9] 06/14/2019       Plan:        1. Continue ceftriaxone, sensitivities reviewed, ciprofloxacin 750 mg twice daily on discharge still 6/21/2019  2.  Plan for cardiac catheterization tomorrow per cardiology, continue aspirin, add statin    Jese Anand MD  6/17/2019  3:02 PM

## 2019-06-17 NOTE — PROGRESS NOTES
Smoking Cessation - topics covered   []  Health Risks  []  Benefits of Quitting   []  Smoking Cessation  [x]  Patient has no history of tobacco use  []  Patient is former smoker. [x]  No need for tobacco cessation education. []  Booklet given  []  Patient verbalizes understanding. []  Patient denies need for tobacco cessation education. []  Unable to meet with patient today. Will follow up as able.   Thi Garsia  10:09 AM

## 2019-06-17 NOTE — PROGRESS NOTES
Hx of sepsis. Reports influenza and prior shingles. CURRENT EVALUATION :6/17/2019    Afebrile  VS stable    The patient seen and evaluated at bedside. Patient is better with no new acute issues or concerns today. Seen waiting for cardiac catheterization      Patient does not have any fevers, chills, cough, shortness of breath, chest pains or palpitations. No dysuria, suprapubic or CVA pain. Labs, X rays reviewed: 6/17/2019    BUN: 12  Cr:0.55    WBC:17.4->18.8  Hb:11.8  Plat: 71->84    Cultures:  Urine:  · 6-14-19: E coli, cefazolin and cipro sensitive  Blood:  · 6-14-19: E coli x 2 cefazolin and cipro sensitive      Discussed with patient, RN, family. I have personally reviewed the past medical history, past surgical history, medications, social history, and family history, and I have updated the database accordingly.   Past Medical History:     Past Medical History:   Diagnosis Date    Hyperlipidemia     Hypertension     Thyroid disease        Past Surgical  History:     Past Surgical History:   Procedure Laterality Date    APPENDECTOMY      KNEE SURGERY Left     SHOULDER SURGERY         Medications:      cetirizine  10 mg Oral Daily    sodium chloride  1 spray Each Nostril TID    atorvastatin  40 mg Oral Nightly    sodium chloride flush  10 mL Intravenous 2 times per day    cefTRIAXone (ROCEPHIN) IV  2 g Intravenous Q24H    levothyroxine  50 mcg Oral Daily    milnacipran HCl  50 mg Oral Daily    oxybutynin  10 mg Oral Daily    pantoprazole  40 mg Oral Daily    lactobacillus  1 capsule Oral TID WC    sodium chloride flush  10 mL Intravenous 2 times per day    enoxaparin  40 mg Subcutaneous Daily    aspirin  81 mg Oral Daily    insulin lispro  0-12 Units Subcutaneous TID WC    insulin lispro  0-6 Units Subcutaneous Nightly       Social History:     Social History     Socioeconomic History    Marital status:      Spouse name: Not on file    Number of children: Not on file or CVA pain  Musculoskeletal: No muscle aches or pains. No joint effusions, swelling or deformities  Hematologic: No bleeding or bruising. Neurologic: No headache, weakness, numbness, or tingling. Integument: No rash, no ulcers. Psychiatric: No depression. Endocrine: No polyuria, no polydipsia, no polyphagia. Physical Examination :     Patient Vitals for the past 8 hrs:   BP Temp Temp src Pulse Resp SpO2 Weight   06/17/19 1146 (!) 156/73 98.1 °F (36.7 °C) Oral 78 19 95 % --   06/17/19 0707 (!) 157/73 98.4 °F (36.9 °C) Oral 79 22 96 % --   06/17/19 4645 -- -- -- -- -- -- 150 lb 4.8 oz (68.2 kg)     General Appearance: Awake, alert, and in no apparent distress  Head:  Normocephalic, no trauma  Eyes: Pupils equal, round, reactive to light and accommodation; extraocular movements intact; sclera anicteric; conjunctivae pink. No embolic phenomena. ENT: Oropharynx clear, without erythema, exudate, or thrush. No tenderness of sinuses. Mouth/throat: mucosa pink and moist. No lesions. Dentition in good repair. Neck:Supple, without lymphadenopathy. Thyroid normal, No bruits. Pulmonary/Chest: Clear to auscultation, without wheezes, rales, or rhonchi. No dullness to percussion. Cardiovascular: Regular rate and rhythm without murmurs, rubs, or gallops. Abdomen: Soft, non tender. Bowel sounds normal. No organomegaly  All four Extremities: No cyanosis, clubbing, edema, or effusions. Neurologic: No gross sensory or motor deficits. pleasntly confused  Skin: Warm and dry with good turgor. Signs of peripheral arterial insufficiency. No ulcerations. No open wounds.     Medical Decision Making -Laboratory:   I have independently reviewed/ordered the following labs:    CBC with Differential:   Recent Labs     06/14/19  1636 06/15/19  0836 06/16/19  0630   WBC 9.0 17.4* 18.8*   HGB 13.9 14.6 11.8*   HCT 41.7 42.8 37.3   * 71* 84*   LYMPHOPCT 2*  --  11*   MONOPCT 2  --  3     BMP:   Recent Labs     06/15/19  0836 GRAM STAIN FROM BOTTLE: GRAM NEGATIVE RODS    Culture 06/14/2019  4:30  Patel Mcintosh   ID by [employer]: ESCHERICHIA COLI    Culture Abnormal  06/14/2019  4:30 PM [facility] [address]    Testing Performed By     Ashish Hamlin Name Director Address Valid Date Range   [address] [address] [facility] [id number] 08/30/17 0801-Present   Susceptibility     Escherichia coli (4)     Antibiotic Interpretation MARYELLEN Status    amikacin   Final     NOT REPORTED   ampicillin Sensitive  Final     4  SUSCEPTIBLE   ampicillin-sulbactam   Final     NOT REPORTED   aztreonam Sensitive  Final     <=1  SUSCEPTIBLE   ceFAZolin Sensitive  Final     <=4  SUSCEPTIBLE   cefepime   Final     NOT REPORTED   cefTRIAXone Sensitive  Final     <=1  SUSCEPTIBLE   ciprofloxacin Sensitive  Final     <=0.25  SUSCEPTIBLE   ertapenem   Final     NOT REPORTED   Confirmatory Extended Spectrum Beta-Lactamase Negative NEGATIVE Final    gentamicin Sensitive  Final     <=1  SUSCEPTIBLE   meropenem   Final     NOT REPORTED   nitrofurantoin   Final     NOT REPORTED   tigecycline   Final     NOT REPORTED   tobramycin Sensitive  Final     <=1  SUSCEPTIBLE   trimethoprim-sulfamethoxazole Sensitive  Final     <=20  SUSCEPTIBLE   piperacillin-tazobactam Sensitive  Final     <=4  SUSCEPTIBLE     6/16/2019  8:35 PM - Marty, [facility] Incoming Lab Results From [facility]     Specimen Information: Blood        Component Collected Lab   Specimen Description 06/14/2019  4:30  Patel Mcintosh   . BLOOD    Special Requests 06/14/2019  4:30  [address] LT HAND    Culture Abnormal  06/14/2019  4:30 PM [address] Blood Culture Results called to and read back by: Kristy Hamlin ON 6/15/19    Culture 06/14/2019  4:30 PM [facility] FROM BOTTLECharolette Dapper NEGATIVE RODS    Culture

## 2019-06-17 NOTE — CARE COORDINATION
Transition planning:  Plan for pt to return home with family support. Cardiac cath today, iv antibiotics currently, may switch to po.

## 2019-06-17 NOTE — OP NOTE
[] IV.    New Diagnosis    [] Yes  [] No    HF Type      [] Systolic   [] Diastolic          [] Unknown. Diagnostic Test:   EKG       [x] Normal   [] Abnormal    New antiarrhythmia medications:    [] Yes   [] No   New onset atrial fibrillation / Flutter     [] Yes   [] No   ECG Abnormalities:      [] V. Fib   [] Mala V. Tach           [] NS V. T   [] New LBBB           [] T. Inv  []  ST dev > 0.5 mm         [] PVC's freq  [] PVC's infrequent  Stress Test:   Type:    [] Stress Echo   [] Exercise Stress Test (no imaging)      [] Stress Nuclear  [] Stress Imaging   Results  [] Negative   [] Positive        [] Indeterminate  [] Unavailable     If Positive/ Risk / Extent of Ischemia:       [] Low  [] Intermediate         [] High  [] Unavailable      Cardiac CTA Performed:   Results   [] CAD   [] Non obstructive CAD      [] No CAD   [] Uncertain      [] Unknown   [] Structural Disease. Pre Procedure Medications:      [x] ASA [x] Beta Blockers      [] Nitrate [] Ca Channel Blockers      [] Ranolazine [x] Statin       [] Plavix/Others antiplatelets        Electronically signed by Ginny Azul MD on 6/17/2019 at 7:29 PM  Cardiology Fellow        I have reviewed the case / procedure with resident / fellow  I have examined the patient personally  Patient agree with treatment plan, correction innotes was made as appropriate, and discussed final arrangement based on  my evaluation and exam.    Risk and benefit of procedure if planned were explained in details. Procedure was performed by me, with all aspect of the procedure being done using standard protocol. Note was modified based on my own assessment and treatment.     Juana Hassan MD  [facility]

## 2019-06-17 NOTE — PROGRESS NOTES
Patient admitted, consent signed and questions answered. Patient ready for procedure. Call light to reach with side rails up 2 of 2. Bilateral groin clipped. Family at bedside with patient. History and physical completed.

## 2019-06-18 VITALS
SYSTOLIC BLOOD PRESSURE: 137 MMHG | RESPIRATION RATE: 16 BRPM | DIASTOLIC BLOOD PRESSURE: 90 MMHG | HEART RATE: 85 BPM | WEIGHT: 150.3 LBS | BODY MASS INDEX: 29.51 KG/M2 | HEIGHT: 60 IN | OXYGEN SATURATION: 95 % | TEMPERATURE: 98.6 F

## 2019-06-18 LAB
ABSOLUTE EOS #: 0.22 K/UL (ref 0–0.44)
ABSOLUTE IMMATURE GRANULOCYTE: 0.1 K/UL (ref 0–0.3)
ABSOLUTE LYMPH #: 1.92 K/UL (ref 1.1–3.7)
ABSOLUTE MONO #: 0.63 K/UL (ref 0.1–1.2)
ANION GAP SERPL CALCULATED.3IONS-SCNC: 13 MMOL/L (ref 9–17)
BASOPHILS # BLD: 0 % (ref 0–2)
BASOPHILS ABSOLUTE: 0.04 K/UL (ref 0–0.2)
BUN BLDV-MCNC: 11 MG/DL (ref 8–23)
BUN/CREAT BLD: ABNORMAL (ref 9–20)
CALCIUM SERPL-MCNC: 8.7 MG/DL (ref 8.6–10.4)
CHLORIDE BLD-SCNC: 110 MMOL/L (ref 98–107)
CO2: 22 MMOL/L (ref 20–31)
CREAT SERPL-MCNC: 0.62 MG/DL (ref 0.5–0.9)
DIFFERENTIAL TYPE: ABNORMAL
EOSINOPHILS RELATIVE PERCENT: 2 % (ref 1–4)
GFR AFRICAN AMERICAN: >60 ML/MIN
GFR NON-AFRICAN AMERICAN: >60 ML/MIN
GFR SERPL CREATININE-BSD FRML MDRD: ABNORMAL ML/MIN/{1.73_M2}
GFR SERPL CREATININE-BSD FRML MDRD: ABNORMAL ML/MIN/{1.73_M2}
GLUCOSE BLD-MCNC: 102 MG/DL (ref 70–99)
GLUCOSE BLD-MCNC: 106 MG/DL (ref 65–105)
GLUCOSE BLD-MCNC: 126 MG/DL (ref 65–105)
HCT VFR BLD CALC: 37.5 % (ref 36.3–47.1)
HEMOGLOBIN: 12 G/DL (ref 11.9–15.1)
IMMATURE GRANULOCYTES: 1 %
INTERVENTION: NORMAL
LYMPHOCYTES # BLD: 19 % (ref 24–43)
MCH RBC QN AUTO: 30.5 PG (ref 25.2–33.5)
MCHC RBC AUTO-ENTMCNC: 32 G/DL (ref 28.4–34.8)
MCV RBC AUTO: 95.2 FL (ref 82.6–102.9)
MONOCYTES # BLD: 6 % (ref 3–12)
NRBC AUTOMATED: 0 PER 100 WBC
PDW BLD-RTO: 13.6 % (ref 11.8–14.4)
PLATELET # BLD: 179 K/UL (ref 138–453)
PLATELET ESTIMATE: ABNORMAL
PMV BLD AUTO: 10.6 FL (ref 8.1–13.5)
POTASSIUM SERPL-SCNC: 3.9 MMOL/L (ref 3.7–5.3)
RBC # BLD: 3.94 M/UL (ref 3.95–5.11)
RBC # BLD: ABNORMAL 10*6/UL
SEG NEUTROPHILS: 71 % (ref 36–65)
SEGMENTED NEUTROPHILS ABSOLUTE COUNT: 7.13 K/UL (ref 1.5–8.1)
SODIUM BLD-SCNC: 145 MMOL/L (ref 135–144)
WBC # BLD: 10 K/UL (ref 3.5–11.3)
WBC # BLD: ABNORMAL 10*3/UL

## 2019-06-18 PROCEDURE — 94760 N-INVAS EAR/PLS OXIMETRY 1: CPT

## 2019-06-18 PROCEDURE — 6370000000 HC RX 637 (ALT 250 FOR IP): Performed by: INTERNAL MEDICINE

## 2019-06-18 PROCEDURE — 6360000002 HC RX W HCPCS: Performed by: INTERNAL MEDICINE

## 2019-06-18 PROCEDURE — 36415 COLL VENOUS BLD VENIPUNCTURE: CPT

## 2019-06-18 PROCEDURE — 85025 COMPLETE CBC W/AUTO DIFF WBC: CPT

## 2019-06-18 PROCEDURE — 82947 ASSAY GLUCOSE BLOOD QUANT: CPT

## 2019-06-18 PROCEDURE — 99239 HOSP IP/OBS DSCHRG MGMT >30: CPT | Performed by: FAMILY MEDICINE

## 2019-06-18 PROCEDURE — 99232 SBSQ HOSP IP/OBS MODERATE 35: CPT | Performed by: INTERNAL MEDICINE

## 2019-06-18 PROCEDURE — 2580000003 HC RX 258: Performed by: INTERNAL MEDICINE

## 2019-06-18 PROCEDURE — 80048 BASIC METABOLIC PNL TOTAL CA: CPT

## 2019-06-18 RX ORDER — ECHINACEA PURPUREA EXTRACT 125 MG
1 TABLET ORAL PRN
Qty: 1 BOTTLE | Refills: 3 | Status: SHIPPED | OUTPATIENT
Start: 2019-06-18 | End: 2019-07-09

## 2019-06-18 RX ORDER — CIPROFLOXACIN 500 MG/1
750 TABLET, FILM COATED ORAL 2 TIMES DAILY
Qty: 9 TABLET | Refills: 0 | Status: SHIPPED | OUTPATIENT
Start: 2019-06-19 | End: 2019-06-22

## 2019-06-18 RX ORDER — ATORVASTATIN CALCIUM 40 MG/1
40 TABLET, FILM COATED ORAL NIGHTLY
Qty: 30 TABLET | Refills: 3 | Status: SHIPPED | OUTPATIENT
Start: 2019-06-18 | End: 2019-07-09

## 2019-06-18 RX ADMIN — CEFTRIAXONE SODIUM 2 G: 2 INJECTION, POWDER, FOR SOLUTION INTRAMUSCULAR; INTRAVENOUS at 12:53

## 2019-06-18 RX ADMIN — CETIRIZINE HYDROCHLORIDE 10 MG: 10 TABLET ORAL at 09:16

## 2019-06-18 RX ADMIN — ASPIRIN 81 MG: 81 TABLET, CHEWABLE ORAL at 09:15

## 2019-06-18 RX ADMIN — OXYBUTYNIN CHLORIDE 10 MG: 10 TABLET, EXTENDED RELEASE ORAL at 09:15

## 2019-06-18 RX ADMIN — Medication 1 CAPSULE: at 12:57

## 2019-06-18 RX ADMIN — SODIUM CHLORIDE, PRESERVATIVE FREE 10 ML: 5 INJECTION INTRAVENOUS at 12:58

## 2019-06-18 RX ADMIN — MILNACIPRAN HYDROCHLORIDE 50 MG: 50 TABLET, FILM COATED ORAL at 09:14

## 2019-06-18 RX ADMIN — ENOXAPARIN SODIUM 40 MG: 40 INJECTION, SOLUTION INTRAVENOUS; SUBCUTANEOUS at 09:16

## 2019-06-18 RX ADMIN — LEVOTHYROXINE SODIUM 50 MCG: 50 TABLET ORAL at 11:34

## 2019-06-18 RX ADMIN — Medication 1 CAPSULE: at 09:16

## 2019-06-18 RX ADMIN — PANTOPRAZOLE SODIUM 40 MG: 40 TABLET, DELAYED RELEASE ORAL at 09:15

## 2019-06-18 ASSESSMENT — ENCOUNTER SYMPTOMS
ABDOMINAL PAIN: 0
COUGH: 0
SHORTNESS OF BREATH: 0
WHEEZING: 0
RHINORRHEA: 0
CONSTIPATION: 0
VOMITING: 0
CHEST TIGHTNESS: 0
NAUSEA: 0
BLOOD IN STOOL: 0
DIARRHEA: 0

## 2019-06-18 ASSESSMENT — PAIN SCALES - GENERAL: PAINLEVEL_OUTOF10: 0

## 2019-06-18 NOTE — PROGRESS NOTES
[facility]  Progress Note                   Date:   6/18/2019  Patient name: Beryl Velázquez  Date of admission:  6/14/2019  4:25 PM  MRN:   [id number]  YOB: 1950  PCP: Sivan Andrade MD    Reason for Admission: Sepsis [facility]) [A41.9]    Subjective:       Clinical Changes /Abnormalities: Seen & examined in room with family at bedside. Denies CP or SOB. No Acute issues overnight     Review of Systems    Medications:   Scheduled Meds:   sodium chloride flush  10 mL Intravenous 2 times per day    cetirizine  10 mg Oral Daily    sodium chloride  1 spray Each Nostril TID    atorvastatin  40 mg Oral Nightly    sodium chloride flush  10 mL Intravenous 2 times per day    cefTRIAXone (ROCEPHIN) IV  2 g Intravenous Q24H    levothyroxine  50 mcg Oral Daily    milnacipran HCl  50 mg Oral Daily    oxybutynin  10 mg Oral Daily    pantoprazole  40 mg Oral Daily    lactobacillus  1 capsule Oral TID WC    sodium chloride flush  10 mL Intravenous 2 times per day    enoxaparin  40 mg Subcutaneous Daily    aspirin  81 mg Oral Daily    insulin lispro  0-12 Units Subcutaneous TID WC    insulin lispro  0-6 Units Subcutaneous Nightly     Continuous Infusions:   sodium chloride 75 mL/hr at 06/17/19 2113    sodium chloride 75 mL/hr at 06/18/19 0914    dextrose       CBC:   Recent Labs     06/16/19  0630 06/18/19  0645   WBC 18.8* 10.0   HGB 11.8* 12.0   PLT 84* 179     BMP:    Recent Labs     06/16/19  0630 06/17/19  0555 06/18/19  0645    140 145*   K 3.6* 4.0 3.9   * 112* 110*   CO2 18* 19* 22   BUN 18 12 11   CREATININE 0.57 0.55 0.62   GLUCOSE 81 95 102*     Hepatic:  No results for input(s): AST, ALT, ALB, BILITOT, ALKPHOS in the last 72 hours. Troponin:   No results for input(s): TROPHS in the last 72 hours. BNP: No results for input(s): BNP in the last 72 hours. Lipids: No results for input(s): CHOL, HDL in the last 72 hours.     Invalid input(s): LDLCALCU  INR:   No

## 2019-06-18 NOTE — CARE COORDINATION
Discharge [facility] Case Management Department  Written by: Jennifer Boykin RN    Patient Name: Georgia Enrique  Attending Provider: Jon Edward MD  Admit Date: 2019  4:25 PM  MRN: [id number]  Account: [de-identified]                     : 1950  Discharge Date:  19      Disposition: home with oral antibiotics     Jennifer Boykin RN

## 2019-06-18 NOTE — PROGRESS NOTES
[facility] - Progress Note    Today's Date and Time: 6/18/2019, 10:24 AM    Impression :   · E coli septicemia 6-14-19 x 2  · E coli. Gram negative UTI 6-14-19  · Altered mental status likely from sepsis-Resolved  · Elevated CRP  likely from sepsis  · Past Hx of influenza and shingles    Recommendations:   · D/C vancomycin and zosyn  · Ceftriaxone 2 gm IV q 24 hr. Stop date 6-21-19  · Plan to transition to Cipro 750 mg po BID at time of discharge. Stop date 6-21-19  · Pt will need to follow up in my office in 2 weeks. Please call for appointment [phone]    Medical Decision Making/Summary/Discussion:6/18/2019     · Patient with acute onset of malaise, abdominal pain and progressive confusion  · Found to have E coli UTI and septicemia  · Elevated CRP likely from septicemia  · She is improving with antibiotics  · Cardiac catheterization 6-17, no significant CAD  Infection Control Recommendations   · [place] Precautions    Antimicrobial Stewardship Recommendations     · Simplification of therapy  · IV to po transition  · Targeted therapy  Coordination of Outpatient Care:   · Estimated Length of IV antimicrobials:6-21-19  · Patient will need Midline Catheter Insertion: No  · Patient will need PICC line Insertion:No  · Patient will need: Home IV , [place],  SNF,  LTAC:TBD  · Patient will need outpatient wound care:No    Chief complaint/reason for consultation:   · Septicemia  · UTI      History of Present Illness:   Ivan Robledo is a 68y.o.-year-old  female who was initially admitted on 6/14/2019. Patient seen at the request of Melissa Choudhary. INITIAL HISTORY:  Patient presented through ER with complaints of altered mentation. According to her family she had not feeling well during the day and had become more confused as the day progressed. She was brought into ER where abdominal pain, UTI and septicemia were detected.     She has past Hx of HTN, HLP and hypothyroidism. No prior Hx of sepsis. Reports influenza and prior shingles. CURRENT EVALUATION :6/18/2019    Afebrile  VS stable    The patient seen and evaluated at bedside. Patient is better with no new acute issues or concerns today - hoping to go home. Cardiac cath 6-17 - showed no significant CAD    Patient does not have any fevers, chills, cough, shortness of breath, chest pains or palpitations. No dysuria, suprapubic or CVA pain. Labs, X rays reviewed: 6/18/2019    BUN: 11  Cr:0.62    WBC:17.4->18.8->10.0  Hb:12  Plat: 71->84->179    Cultures:  Urine:  · 6-14-19: E coli, cefazolin and cipro sensitive  Blood:  · 6-14-19: E coli x 2 cefazolin and cipro sensitive      Discussed with patient, RN, family. I have personally reviewed the past medical history, past surgical history, medications, social history, and family history, and I have updated the database accordingly.   Past Medical History:     Past Medical History:   Diagnosis Date    Hyperlipidemia     Hypertension     Thyroid disease        Past Surgical  History:     Past Surgical History:   Procedure Laterality Date    APPENDECTOMY      KNEE SURGERY Left     SHOULDER SURGERY         Medications:      sodium chloride flush  10 mL Intravenous 2 times per day    cetirizine  10 mg Oral Daily    sodium chloride  1 spray Each Nostril TID    atorvastatin  40 mg Oral Nightly    sodium chloride flush  10 mL Intravenous 2 times per day    cefTRIAXone (ROCEPHIN) IV  2 g Intravenous Q24H    levothyroxine  50 mcg Oral Daily    milnacipran HCl  50 mg Oral Daily    oxybutynin  10 mg Oral Daily    pantoprazole  40 mg Oral Daily    lactobacillus  1 capsule Oral TID WC    sodium chloride flush  10 mL Intravenous 2 times per day    enoxaparin  40 mg Subcutaneous Daily    aspirin  81 mg Oral Daily    insulin lispro  0-12 Units Subcutaneous TID WC    insulin lispro  0-6 Units Subcutaneous Nightly       Social History:     Social History Socioeconomic History    Marital status:      Spouse name: Not on file    Number of children: Not on file    Years of education: Not on file    Highest education level: Not on file   Occupational History    Not on file   Social Needs    Financial resource strain: Not on file    Food insecurity:     Worry: Not on file     Inability: Not on file    Transportation needs:     Medical: Not on file     Non-medical: Not on file   Tobacco Use    Smoking status: Never Smoker    Smokeless tobacco: Never Used   Substance and Sexual Activity    Alcohol use: Not Currently    Drug use: Never    Sexual activity: Not Currently   Lifestyle    Physical activity:     Days per week: Not on file     Minutes per session: Not on file    Stress: Not on file   Relationships    Social connections:     Talks on phone: Not on file     Gets together: Not on file     Attends Faith service: Not on file     Active member of club or organization: Not on file     Attends meetings of clubs or organizations: Not on file     Relationship status: Not on file    Intimate partner violence:     Fear of current or ex partner: Not on file     Emotionally abused: Not on file     Physically abused: Not on file     Forced sexual activity: Not on file   Other Topics Concern    Not on file   Social History Narrative    Not on file       Family History:   History reviewed. No pertinent family history. Allergies:   Lyrica [pregabalin]; Vicodin [hydrocodone-acetaminophen]; and Meloxicam     Review of Systems:   Constitutional: No fevers or chills. No systemic complaints, confusion  Head: No headaches  Eyes: No double vision or blurry vision. No conjunctival inflammation. ENT: No sore throat or runny nose. . No hearing loss, tinnitus or vertigo. Cardiovascular: No chest pain or palpitations. No shortness of breath. No CARLTON  Lung: No shortness of breath or cough. No sputum production  Abdomen: No nausea, vomiting, diarrhea. Reported abdominal pain. Jerryl Boyers No cramps. Genitourinary: No increased urinary frequency, or dysuria. No hematuria. No suprapubic or CVA pain  Musculoskeletal: No muscle aches or pains. No joint effusions, swelling or deformities  Hematologic: No bleeding or bruising. Neurologic: No headache, weakness, numbness, or tingling. Integument: No rash, no ulcers. Psychiatric: No depression. Endocrine: No polyuria, no polydipsia, no polyphagia. Physical Examination :     Patient Vitals for the past 8 hrs:   BP Temp Temp src Pulse Resp SpO2   06/18/19 0940 -- -- -- -- 16 95 %   06/18/19 0655 138/65 97.9 °F (36.6 °C) Oral -- -- --   06/18/19 0346 (!) 161/72 99.3 °F (37.4 °C) Oral 81 20 99 %     General Appearance: Awake, alert, and in no apparent distress  Head:  Normocephalic, no trauma  Eyes: Pupils equal, round, reactive to light and accommodation; extraocular movements intact; sclera anicteric; conjunctivae pink. No embolic phenomena. ENT: Oropharynx clear, without erythema, exudate, or thrush. No tenderness of sinuses. Mouth/throat: mucosa pink and moist. No lesions. Dentition in good repair. Neck:Supple, without lymphadenopathy. Thyroid normal, No bruits. Pulmonary/Chest: Clear to auscultation, without wheezes, rales, or rhonchi. No dullness to percussion. Cardiovascular: Regular rate and rhythm without murmurs, rubs, or gallops. Abdomen: Soft, non tender. Bowel sounds normal. No organomegaly  All four Extremities: No cyanosis, clubbing, edema, or effusions. Neurologic: No gross sensory or motor deficits. pleasntly confused  Skin: Warm and dry with good turgor. Signs of peripheral arterial insufficiency. No ulcerations. No open wounds.  Rt wrist cath site dressing clean    Medical Decision Making -Laboratory:   I have independently reviewed/ordered the following labs:    CBC with Differential:   Recent Labs     06/16/19  0630 06/18/19  0645   WBC 18.8* 10.0   HGB 11.8* 12.0   HCT 37.3 37.5   PLT 84* 179 ESCHERICHIA COLI For susceptibility, refer to previous culture. 6/15/2019 10:09 PM - Karly Ferguson Incoming Lab Results From [id number]     Specimen Information: Urine, clean catch        Component Collected Lab   Specimen Description 06/14/2019  4:57  [address]   . CLEAN CATCH URINE    Special Requests 06/14/2019  4:57  [address]   NOT REPORTED    Culture Abnormal  06/14/2019  4:57 PM [address] >[id number] CFU/ML    Testing Performed By     Ashish Hamlin Name Director Address Valid Date Range   [address], [place] [address] [address] [facility] [address] 08/30/17 0801-Present   Susceptibility     Escherichia coli (1)     Antibiotic Interpretation MARYELLEN Status    amikacin   Final     NOT REPORTED   ampicillin Sensitive  Final     4  SUSCEPTIBLE   ampicillin-sulbactam   Final     NOT REPORTED   aztreonam Sensitive  Final     <=1  SUSCEPTIBLE   ceFAZolin Sensitive  Final     <=4  SUSCEPTIBLE   ceFAZolin Sensitive Cefazolin sensitivity results can be used to predict the effectiveness of oral cephalosporins (eg.  Cephalexin) in uncomplicated Urinary Tract Infections due to E. coli, K. pneumoniae, and P. mirabilis Final    cefepime   Final     NOT REPORTED   cefTRIAXone Sensitive  Final     <=1  SUSCEPTIBLE   ciprofloxacin Sensitive  Final     <=0.25  SUSCEPTIBLE   ertapenem   Final     NOT REPORTED   Confirmatory Extended Spectrum Beta-Lactamase Negative NEGATIVE Final    gentamicin Sensitive  Final     <=1  SUSCEPTIBLE   meropenem   Final     NOT REPORTED   nitrofurantoin Sensitive  Final     <=16  SUSCEPTIBLE   tigecycline   Final     NOT REPORTED   tobramycin Sensitive  Final     <=1  SUSCEPTIBLE   trimethoprim-sulfamethoxazole Sensitive  Final     <=20  SUSCEPTIBLE   piperacillin-tazobactam Sensitive  Final     <=4  SUSCEPTIBLE         Medical Decision Making-Other:     Note: Daily decision making

## 2019-06-18 NOTE — PROGRESS NOTES
[facility]      Daily Progress Note     Admit Date: 6/14/2019  Bed/Room No.  3006/3006-01  Admitting Physician : Sunitha Burnett MD  Code Status :[address] Day:  LOS: 4 days   Chief Complaint:     Chief Complaint   Patient presents with    Altered Mental Status    Fever     Principal Problem:    Sepsis ([address]  Active Problems:    Septicemia ([address]    UTI (urinary tract infection)    NSTEMI (non-ST elevated myocardial infarction) ([address]    Acquired hypothyroidism    Hypokalemia    Septicemia due to E. coli ([address]  Resolved Problems:    * No resolved hospital problems. *    Subjective : Interval History/Significant events :  06/18/19    Patient denies any fever, chills, nausea, vomiting. She has no complaints for me today. Denies any dysuria, frequency. Vitals - Stable afebrile  Labs - stable    Nursing notes , Consults notes reviewed. Overnight events and updates discussed with Nursing staff . Background History:         Kelly Ramirez is 68 y.o. female  Who was admitted to the hospital on 6/14/2019 for treatment of Sepsis ([address] Patient was brought to emergency room with altered mental status, chills, fever. Initial evaluation showed hypotension, tachycardia. Patient was admitted for sepsis and treated with IV fluids, IV Rocephin. She was found to have positive blood and urine culture for E. coli. Patient also had elevated troponin and cardiology was consulted. ID was consulted and recommended Cipro at discharge. PMH:  Past Medical History:   Diagnosis Date    Hyperlipidemia     Hypertension     Thyroid disease       Allergies:    Allergies   Allergen Reactions    Lyrica [Pregabalin]     Vicodin [Hydrocodone-Acetaminophen] Itching    Meloxicam Nausea And Vomiting      Medications :    sodium chloride flush 10 mL Intravenous 2 times per day   cetirizine 10 mg Oral Daily   sodium chloride 1 spray Each Nostril TID   atorvastatin 40 mg Oral Nightly   sodium chloride flush 10 mL Intravenous 2 times per day   cefTRIAXone (ROCEPHIN) IV 2 g Intravenous Q24H   levothyroxine 50 mcg Oral Daily   milnacipran HCl 50 mg Oral Daily   oxybutynin 10 mg Oral Daily   pantoprazole 40 mg Oral Daily   lactobacillus 1 capsule Oral TID    sodium chloride flush 10 mL Intravenous 2 times per day   enoxaparin 40 mg Subcutaneous Daily   aspirin 81 mg Oral Daily   insulin lispro 0-12 Units Subcutaneous TID    insulin lispro 0-6 Units Subcutaneous Nightly       Review of Systems   Review of Systems   Constitutional: Negative for appetite change, fatigue, fever and unexpected weight change. HENT: Negative for congestion, rhinorrhea and sneezing. Eyes: Negative for visual disturbance. Respiratory: Negative for cough, chest tightness, shortness of breath and wheezing. Cardiovascular: Negative for chest pain and palpitations. Gastrointestinal: Negative for abdominal pain, blood in stool, constipation, diarrhea, nausea and vomiting. Genitourinary: Negative for dysuria, enuresis, frequency and hematuria. Musculoskeletal: Negative for arthralgias and myalgias. Skin: Negative for rash. Neurological: Negative for dizziness, weakness, light-headedness and headaches. Hematological: Does not bruise/bleed easily. Psychiatric/Behavioral: Negative for dysphoric mood and sleep disturbance.      Objective :      Current Vitals : Temp: 97.9 °F (36.6 °C),  Pulse: 81, Resp: 16, BP: 138/65, SpO2: 95 %  Last 24 Hrs Vitals   Patient Vitals for the past 24 hrs:   BP Temp Temp src Pulse Resp SpO2   06/18/19 0940 -- -- -- -- 16 95 %   06/18/19 0655 138/65 97.9 °F (36.6 °C) Oral -- -- --   06/18/19 0346 (!) 161/72 99.3 °F (37.4 °C) Oral 81 20 99 %   06/17/19 2317 -- 99.9 °F (37.7 °C) Oral -- -- --   06/17/19 2301 (!) 165/71 -- -- 88 22 --   06/17/19 2030 (!) 170/77 -- -- 80 18 96 %   06/17/19 2015 (!) 170/66 -- -- 85 21 95 %   06/17/19 2007 (!) 165/77 98 °F (36.7 °C) Oral 83 17 96 %   06/17/19 Comment:     Reference range defined for non-centrifuged specimen. Bacteria, UA   Date Value Ref Range Status   06/14/2019 MANY (A) None Final     Nitrite, Urine   Date Value Ref Range Status   06/14/2019 NEGATIVE NEGATIVE Final     WBC, UA   Date Value Ref Range Status   06/14/2019 20 TO 50 0 - 5 /HPF Final     Leukocyte Esterase, Urine   Date Value Ref Range Status   06/14/2019 MODERATE (A) NEGATIVE Final       Imaging / Clinical Data :-   Xr Chest Portable    Result Date: 6/14/2019  No acute process. Echocardiogram 6/17/2019  Calculated LVEF 59%. Grade 1 diastolic dysfunction. Mild mitral and tricuspid regurgitation. Cardiac cath 6/17/2019  Nonobstructive minimal CAD  Clinical Course : stable  Assessment and Plan  :        1. Sepsis secondary to UTI with bacteremia - on Rocephin. Planning discharge on ciprofloxacin 750 mg twice daily with stop date 6/21/2019.  2. Elevated troponin secondary to demand ischemia - normal coronaries  6/17/2019.  3. Acquired hypothyroidism - synthroid. 4. Hypokalemia. Continue to monitor vitals , Intake / output ,  Cell count , HGB , Kidney function, oxygenation  as indicated . Plan and updates discussed with patient ,  answers  explained to satisfaction.    Plan discussed with Staff  RN     (Please note that portions of this note were completed with a voice recognition program. Efforts were made to edit the dictations but occasionally words are mis-transcribed.)      Bryon Weems MD  6/18/2019

## 2019-06-18 NOTE — PROGRESS NOTES
Smoking Cessation - topics covered   []  Health Risks  []  Benefits of Quitting   []  Smoking Cessation  [x]  Patient has no history of tobacco use  []  Patient is former smoker. [x]  No need for tobacco cessation education. []  Booklet given  []  Patient verbalizes understanding. []  Patient denies need for tobacco cessation education. []  Unable to meet with patient today. Will follow up as able.   Joyce Persaud  8:44 AM

## 2019-06-18 NOTE — DISCHARGE SUMMARY
[facility]      Discharge Summary     Patient ID: Jeana Malcolm  :  1950   MRN: [id number]     ACCOUNT:  [de-identified]   Patient Location : [address] [place] [address]  Patient's PCP: Mic Mcintyre MD  Admit Date: 2019   Discharge Date: 2019     Length of Stay: 4  Code Status:  Full Code  Admitting Physician: Sage Degroot MD  Discharge Physician: Rishabh Diallo MD     Active Discharge Diagnosis :     Primary Problem  Sepsis [facility])      [facility] Problems    Diagnosis Date Noted    Septicemia due to E. coli ([facility] [A41.51]     Septicemia ([facility] [A41.9] 06/15/2019    UTI (urinary tract infection) [N39.0] 06/15/2019    NSTEMI (non-ST elevated myocardial infarction) ([facility] [I21.4] 06/15/2019    Acquired hypothyroidism [E03.9] 06/15/2019    Hypokalemia [E87.6]     Sepsis ([facility] [A41.9] 2019       Admission Condition:  stable     Discharged Condition: stable    Hospital Stay:     Hospital Course:  Jeana Maclolm is a 68 y.o. female who was admitted for the management of   Sepsis ([facility] , presented to ER with Altered Mental Status and Fever    Patient was brought to emergency room with altered mental status, chills, fever. Initial evaluation showed hypotension, tachycardia. Patient was admitted for sepsis and treated with IV fluids, IV Rocephin. She was found to have positive blood and urine culture for E. coli. Patient also had elevated troponin and cardiology was consulted. ID was consulted and recommended Cipro at discharge. Significant therapeutic interventions:   1. Sepsis secondary to UTI with bacteremia - on Rocephin. Planning discharge on ciprofloxacin 750 mg twice daily with stop date 2019.  2. Elevated troponin secondary to demand ischemia - normal coronaries  2019.  3. Acquired hypothyroidism - synthroid. 4. Hypokalemia.            Significant Diagnostic Studies:   Labs / Micro:/Radiology  Recent Labs     19  8762 06/16/19  0630 06/15/19  0836   WBC 10.0 18.8* 17.4*   HGB 12.0 11.8* 14.6   HCT 37.5 37.3 42.8   MCV 95.2 95.2 88.8    84* 71*     Labs Renal Latest Ref Rng & Units 6/18/2019 6/17/2019 6/16/2019 6/15/2019 6/14/2019   BUN 8 - 23 mg/dL 11 12 18 24(H) 26(H)   Cr 0.50 - 0.90 mg/dL 0.62 0.55 0.57 0.82 1.12(H)   K 3.7 - 5.3 mmol/L 3.9 4.0 3.6(L) 3.9 2.9(LL)   Na 135 - 144 mmol/L 145(H) 140 143 142 138     Lab Results   Component Value Date    ALT 16 06/15/2019    AST 18 06/15/2019    ALKPHOS 56 06/15/2019    BILITOT 0.54 06/15/2019     No results found for: TSHFT4, TSH  No results found for: HAV, HEPAIGM, HEPBIGM, HEPBCAB, HBEAG, HEPCAB  Lab Results   Component Value Date    COLORU YELLOW 06/14/2019    NITRU NEGATIVE 06/14/2019    GLUCOSEU NEGATIVE 06/14/2019    KETUA NEGATIVE 06/14/2019    UROBILINOGEN Normal 06/14/2019    BILIRUBINUR NEGATIVE 06/14/2019     No results found for: LABA1C  No results found for: EAG  Lab Results   Component Value Date    INR 1.3 06/15/2019    INR 1.1 06/14/2019    PROTIME 13.3 (H) 06/15/2019    PROTIME 11.8 06/14/2019       Xr Chest Portable    Result Date: 6/14/2019  No acute process. Echocardiogram 6/17/2019  Calculated LVEF 59%. Grade 1 diastolic dysfunction. Mild mitral and tricuspid regurgitation.     Cardiac cath 6/17/2019  Nonobstructive minimal CAD        Consultations:    Consults:     Final Specialist Recommendations/Findings:   IP CONSULT TO CARDIOLOGY  IP CONSULT TO HOSPITALIST  IP CONSULT TO INFECTIOUS DISEASES      The patient was seen and examined on day of discharge and this discharge summary is in conjunction with any daily progress note from day of discharge. Discharge plan:     Disposition: Home    Physician Follow Up:     Rosemary Tomlinson MD  [address]  [phone]    In 1 week      Era [address]od[address] [facility]  [phone]    Schedule an appointment as soon as possible for a visit in 2 weeks         Requiring Further Evaluation/Follow Up POST HOSPITALIZATION/Incidental Findings: follow up with PCP . Diet: cardiac diet    Activity: As tolerated. Instructions to Patient: medication as advised. Discharge Medications:      Medication List      START taking these medications    atorvastatin 40 MG tablet  Commonly known as:  LIPITOR  Take 1 tablet by mouth nightly     ciprofloxacin 500 MG tablet  Commonly known as:  CIPRO  Take 1.5 tablets by mouth 2 times daily for 3 days  Start taking on:  6/19/2019     sodium chloride 0.65 % nasal spray  Commonly known as:  OCEAN  1 spray by Nasal route as needed for Congestion        CONTINUE taking these medications    aspirin 81 MG EC tablet     CORICIDIN HBP COLD/FLU PO     levothyroxine 50 MCG tablet  Commonly known as:  SYNTHROID     losartan-hydrochlorothiazide 100-12.5 MG per tablet  Commonly known as:  HYZAAR     nabumetone 500 MG tablet  Commonly known as:  RELAFEN     oxybutynin 10 MG extended release tablet  Commonly known as:  DITROPAN-XL     pantoprazole 40 MG tablet  Commonly known as:  PROTONIX     SAVELLA 50 MG Tabs  Generic drug:  milnacipran HCl     Vitamin D3 2000 units Caps           Where to Get Your Medications      These medications were sent to [address] [place] [address]. [address] [facility] [address] [phone]  [address] () [facility]    Phone:  [phone]   · atorvastatin 40 MG tablet  · ciprofloxacin 500 MG tablet  · sodium chloride 0.65 % nasal spray         Time Spent on discharge is  33 mins in patient examination, evaluation, counseling as well as medication reconciliation, prescriptions for required medications, discharge plan and follow up. Electronically signed by   Apolinar Hendricks MD  6/18/2019        Thank you Dr. Aaron Howell MD for the opportunity to be involved in this patient's care.

## 2019-07-08 NOTE — PROGRESS NOTES
[facility] - Office Progress Note  Today's Date and Time: 7/9/2019, 11:31 AM    Diagnostic Impression :     1. Septicemia due to E. coli ([address]    2. Acute cystitis without hematuria        Recommendations   · No antibiotics necessary at this time. · Prior infections controlled. No signs of relapse. · Schedule follow up appointment on an as needed basis    Chief complaint/reason for consultation:     Chief Complaint   Patient presents with    Follow-Up from Hospital     E coli urosepsis       History of Present Illness:   Guilherme Guerra is a 68y.o.-year-old  female who was evaluated on 7/9/2019. Patient seen at the request of Dr. Nader Watson:  Patient presented through ER on 6-14-19 with complaints of altered mentation. According to her family she had not been feeling well during the day and had become more confused as the day progressed. She was brought into ER where abdominal pain, UTI and septicemia were detected. She was treated with Ceftriaxone 2gm IV q24 hours while hospitalized and transitioned to Cipro 750 mg po bid at the time of discharge on 6-18. She was scheduled to complete her antibiotics on 6-21-19. She had a cardiac cath on 6-17 that showed no significant CAD.     She has past Hx of HTN, HLP and hypothyroidism. No prior Hx of sepsis. Reports influenza and prior shingles. CURRENT EVALUATION: 7/9/2019    Today the patient was accompanied with her spouse. She reports that she had completed her antibiotic course and had no acute complaints. She denies frequency, urgency, dysuria, or any difficulties associated with urination. She also denies fevers, chills, night sweats, chest pain, SOB, or lightheadedness. She had a urine analysis completed on 6/28/19 at [address] which showed no evidence of E. Coli and normal urogenital alvaro.        DISCUSSION:  · Patient with acute onset of malaise, abdominal pain and progressive confusion  · Found to have E coli UTI and septicemia  · Elevated CRP likely from septicemia  · She is improving with antibiotics  · Cardiac catheterization 6-17, no significant CAD  · Discharged home on Cipro 750 mg po BID through 6-21-19  · Completed antibiotic treatment. · No signs of relapse. PLAN:    · No antibiotics necessary at this time. · Prior infections controlled. No signs of relapse. · Schedule follow up appointment on an as needed basis      Discussed with patient, family. I have personally reviewed the past medical history, past surgical history, medications, social history, and family history, and I have updated the database accordingly.   Past Medical History:     Past Medical History:   Diagnosis Date    Hyperlipidemia     Hypertension     Thyroid disease        Past Surgical  History:     Past Surgical History:   Procedure Laterality Date    APPENDECTOMY      KNEE SURGERY Left     SHOULDER SURGERY         Medications:     Current Outpatient Medications:     atorvastatin (LIPITOR) 40 MG tablet, Take 1 tablet by mouth nightly, Disp: 30 tablet, Rfl: 3    sodium chloride (OCEAN) 0.65 % nasal spray, 1 spray by Nasal route as needed for Congestion, Disp: 1 Bottle, Rfl: 3    levothyroxine (SYNTHROID) 50 MCG tablet, Take 50 mcg by mouth Daily, Disp: , Rfl:     losartan-hydrochlorothiazide (HYZAAR) 100-12.5 MG per tablet, Take 1 tablet by mouth daily, Disp: , Rfl:     Chlorpheniramine-Acetaminophen (CORICIDIN HBP COLD/FLU PO), Take by mouth, Disp: , Rfl:     nabumetone (RELAFEN) 500 MG tablet, Take 500 mg by mouth 2 times daily, Disp: , Rfl:     milnacipran HCl (SAVELLA) 50 MG TABS, Take 50 mg by mouth daily, Disp: , Rfl:     Cholecalciferol (VITAMIN D3) 2000 units CAPS, Take by mouth, Disp: , Rfl:     oxybutynin (DITROPAN-XL) 10 MG extended release tablet, Take 10 mg by mouth daily, Disp: , Rfl:     aspirin 81 MG EC tablet, Take 81 mg by mouth daily, Disp: , Rfl:     pantoprazole

## 2019-07-09 ENCOUNTER — OFFICE VISIT (OUTPATIENT)
Dept: INFECTIOUS DISEASES | Age: 69
End: 2019-07-09
Payer: MEDICARE

## 2019-07-09 VITALS
HEIGHT: 60 IN | SYSTOLIC BLOOD PRESSURE: 135 MMHG | HEART RATE: 87 BPM | WEIGHT: 145.6 LBS | RESPIRATION RATE: 12 BRPM | DIASTOLIC BLOOD PRESSURE: 78 MMHG | BODY MASS INDEX: 28.58 KG/M2

## 2019-07-09 DIAGNOSIS — A41.51 SEPTICEMIA DUE TO E. COLI (HCC): Primary | ICD-10-CM

## 2019-07-09 DIAGNOSIS — N30.00 ACUTE CYSTITIS WITHOUT HEMATURIA: ICD-10-CM

## 2019-07-09 PROBLEM — M17.12 OSTEOARTHRITIS OF LEFT KNEE: Status: ACTIVE | Noted: 2018-10-19

## 2019-07-09 PROBLEM — K21.9 GASTROESOPHAGEAL REFLUX DISEASE WITHOUT ESOPHAGITIS: Status: ACTIVE | Noted: 2018-11-01

## 2019-07-09 PROBLEM — I10 ESSENTIAL HYPERTENSION: Status: ACTIVE | Noted: 2018-11-01

## 2019-07-09 PROBLEM — G47.33 OSA (OBSTRUCTIVE SLEEP APNEA): Status: ACTIVE | Noted: 2018-11-01

## 2019-07-09 PROBLEM — M17.11 OSTEOARTHRITIS OF RIGHT KNEE: Status: ACTIVE | Noted: 2018-10-19

## 2019-07-09 PROBLEM — R01.1 CARDIAC MURMUR: Status: ACTIVE | Noted: 2018-11-01

## 2019-07-09 PROBLEM — E03.8 OTHER SPECIFIED HYPOTHYROIDISM: Status: ACTIVE | Noted: 2018-11-01

## 2019-07-09 PROBLEM — M79.7 FIBROMYALGIA: Status: ACTIVE | Noted: 2018-11-01

## 2019-07-09 PROCEDURE — 4040F PNEUMOC VAC/ADMIN/RCVD: CPT | Performed by: INTERNAL MEDICINE

## 2019-07-09 PROCEDURE — 3017F COLORECTAL CA SCREEN DOC REV: CPT | Performed by: INTERNAL MEDICINE

## 2019-07-09 PROCEDURE — G8598 ASA/ANTIPLAT THER USED: HCPCS | Performed by: INTERNAL MEDICINE

## 2019-07-09 PROCEDURE — G8400 PT W/DXA NO RESULTS DOC: HCPCS | Performed by: INTERNAL MEDICINE

## 2019-07-09 PROCEDURE — 1123F ACP DISCUSS/DSCN MKR DOCD: CPT | Performed by: INTERNAL MEDICINE

## 2019-07-09 PROCEDURE — 99213 OFFICE O/P EST LOW 20 MIN: CPT | Performed by: INTERNAL MEDICINE

## 2019-07-09 PROCEDURE — G8427 DOCREV CUR MEDS BY ELIG CLIN: HCPCS | Performed by: INTERNAL MEDICINE

## 2019-07-09 PROCEDURE — 1111F DSCHRG MED/CURRENT MED MERGE: CPT | Performed by: INTERNAL MEDICINE

## 2019-07-09 PROCEDURE — 1036F TOBACCO NON-USER: CPT | Performed by: INTERNAL MEDICINE

## 2019-07-09 PROCEDURE — 1090F PRES/ABSN URINE INCON ASSESS: CPT | Performed by: INTERNAL MEDICINE

## 2019-07-09 PROCEDURE — G8419 CALC BMI OUT NRM PARAM NOF/U: HCPCS | Performed by: INTERNAL MEDICINE

## 2019-07-09 RX ORDER — SIMVASTATIN 40 MG
40 TABLET ORAL DAILY
Refills: 3 | COMMUNITY
Start: 2019-06-15

## 2019-07-15 PROBLEM — N39.0 UTI (URINARY TRACT INFECTION): Status: RESOLVED | Noted: 2019-06-15 | Resolved: 2019-07-15

## 2019-07-26 ENCOUNTER — HOSPITAL ENCOUNTER (OUTPATIENT)
Dept: ULTRASOUND IMAGING | Age: 69
Discharge: HOME | End: 2019-07-28
Payer: MEDICARE

## 2019-07-26 DIAGNOSIS — K11.20 SALIVARY GLAND ADENITIS: ICD-10-CM

## 2019-07-26 PROCEDURE — 76536 US EXAM OF HEAD AND NECK: CPT

## 2021-03-02 ENCOUNTER — HOSPITAL ENCOUNTER (OUTPATIENT)
Dept: GENERAL RADIOLOGY | Facility: CLINIC | Age: 71
Discharge: HOME | End: 2021-03-04
Payer: MEDICARE

## 2021-03-02 ENCOUNTER — HOSPITAL ENCOUNTER (OUTPATIENT)
Facility: CLINIC | Age: 71
Discharge: HOME | End: 2021-03-04
Payer: MEDICARE

## 2021-03-02 DIAGNOSIS — M19.042 ARTHRITIS OF LEFT HAND: ICD-10-CM

## 2021-03-02 PROCEDURE — 73130 X-RAY EXAM OF HAND: CPT

## 2021-07-28 ENCOUNTER — HOSPITAL ENCOUNTER (OUTPATIENT)
Dept: MAMMOGRAPHY | Age: 71
Discharge: HOME | End: 2021-07-30
Payer: MEDICARE

## 2021-07-28 DIAGNOSIS — Z12.31 VISIT FOR SCREENING MAMMOGRAM: ICD-10-CM

## 2021-07-28 PROCEDURE — 77063 BREAST TOMOSYNTHESIS BI: CPT

## 2022-02-01 ENCOUNTER — HOSPITAL ENCOUNTER (OUTPATIENT)
Dept: MAMMOGRAPHY | Age: 72
Discharge: HOME | End: 2022-02-03
Payer: MEDICARE

## 2022-02-01 ENCOUNTER — HOSPITAL ENCOUNTER (OUTPATIENT)
Age: 72
Discharge: HOME | End: 2022-02-01
Payer: MEDICARE

## 2022-02-01 DIAGNOSIS — Z78.0 MENOPAUSE: ICD-10-CM

## 2022-02-01 LAB
ABSOLUTE EOS #: 0.12 K/UL (ref 0–0.44)
ABSOLUTE IMMATURE GRANULOCYTE: <0.03 K/UL (ref 0–0.3)
ABSOLUTE LYMPH #: 1.15 K/UL (ref 1.1–3.7)
ABSOLUTE MONO #: 0.35 K/UL (ref 0.1–1.2)
ALBUMIN SERPL-MCNC: 4.6 G/DL (ref 3.5–5.2)
ALBUMIN/GLOBULIN RATIO: 1.7 (ref 1–2.5)
ALP BLD-CCNC: 88 U/L (ref 35–104)
ALT SERPL-CCNC: 33 U/L (ref 5–33)
ANION GAP SERPL CALCULATED.3IONS-SCNC: 15 MMOL/L (ref 9–17)
AST SERPL-CCNC: 22 U/L
BASOPHILS # BLD: 0 % (ref 0–2)
BASOPHILS ABSOLUTE: <0.03 K/UL (ref 0–0.2)
BILIRUB SERPL-MCNC: 0.59 MG/DL (ref 0.3–1.2)
BUN BLDV-MCNC: 24 MG/DL (ref 8–23)
BUN/CREAT BLD: ABNORMAL (ref 9–20)
CALCIUM SERPL-MCNC: 9.8 MG/DL (ref 8.6–10.4)
CHLORIDE BLD-SCNC: 108 MMOL/L (ref 98–107)
CHOLESTEROL/HDL RATIO: 3
CHOLESTEROL: 124 MG/DL
CO2: 24 MMOL/L (ref 20–31)
CREAT SERPL-MCNC: 0.65 MG/DL (ref 0.5–0.9)
DIFFERENTIAL TYPE: ABNORMAL
EOSINOPHILS RELATIVE PERCENT: 3 % (ref 1–4)
GFR AFRICAN AMERICAN: >60 ML/MIN
GFR NON-AFRICAN AMERICAN: >60 ML/MIN
GFR SERPL CREATININE-BSD FRML MDRD: ABNORMAL ML/MIN/{1.73_M2}
GFR SERPL CREATININE-BSD FRML MDRD: ABNORMAL ML/MIN/{1.73_M2}
GLUCOSE BLD-MCNC: 181 MG/DL (ref 70–99)
HCT VFR BLD CALC: 43.5 % (ref 36.3–47.1)
HDLC SERPL-MCNC: 42 MG/DL
HEMOGLOBIN: 14.9 G/DL (ref 11.9–15.1)
IMMATURE GRANULOCYTES: 0 %
LDL CHOLESTEROL: 49 MG/DL (ref 0–130)
LYMPHOCYTES # BLD: 24 % (ref 24–43)
MAGNESIUM: 2.1 MG/DL (ref 1.6–2.6)
MCH RBC QN AUTO: 30.8 PG (ref 25.2–33.5)
MCHC RBC AUTO-ENTMCNC: 34.3 G/DL (ref 28.4–34.8)
MCV RBC AUTO: 89.9 FL (ref 82.6–102.9)
MONOCYTES # BLD: 7 % (ref 3–12)
NRBC AUTOMATED: 0 PER 100 WBC
PDW BLD-RTO: 12.5 % (ref 11.8–14.4)
PLATELET # BLD: 197 K/UL (ref 138–453)
PLATELET ESTIMATE: ABNORMAL
PMV BLD AUTO: 10.2 FL (ref 8.1–13.5)
POTASSIUM SERPL-SCNC: 3.8 MMOL/L (ref 3.7–5.3)
RBC # BLD: 4.84 M/UL (ref 3.95–5.11)
RBC # BLD: ABNORMAL 10*6/UL
SEG NEUTROPHILS: 66 % (ref 36–65)
SEGMENTED NEUTROPHILS ABSOLUTE COUNT: 3.12 K/UL (ref 1.5–8.1)
SODIUM BLD-SCNC: 147 MMOL/L (ref 135–144)
THYROXINE, FREE: 1.34 NG/DL (ref 0.93–1.7)
TOTAL PROTEIN: 7.3 G/DL (ref 6.4–8.3)
TRIGL SERPL-MCNC: 167 MG/DL
TSH SERPL DL<=0.05 MIU/L-ACNC: 0.99 MIU/L (ref 0.3–5)
VLDLC SERPL CALC-MCNC: ABNORMAL MG/DL (ref 1–30)
WBC # BLD: 4.8 K/UL (ref 3.5–11.3)
WBC # BLD: ABNORMAL 10*3/UL

## 2022-02-01 PROCEDURE — 84630 ASSAY OF ZINC: CPT

## 2022-02-01 PROCEDURE — 85025 COMPLETE CBC W/AUTO DIFF WBC: CPT

## 2022-02-01 PROCEDURE — 77080 DXA BONE DENSITY AXIAL: CPT

## 2022-02-01 PROCEDURE — 36415 COLL VENOUS BLD VENIPUNCTURE: CPT

## 2022-02-01 PROCEDURE — 80053 COMPREHEN METABOLIC PANEL: CPT

## 2022-02-01 PROCEDURE — 82607 VITAMIN B-12: CPT

## 2022-02-01 PROCEDURE — 84439 ASSAY OF FREE THYROXINE: CPT

## 2022-02-01 PROCEDURE — 80061 LIPID PANEL: CPT

## 2022-02-01 PROCEDURE — 83735 ASSAY OF MAGNESIUM: CPT

## 2022-02-01 PROCEDURE — 84443 ASSAY THYROID STIM HORMONE: CPT

## 2022-02-02 LAB — VITAMIN B-12: 419 PG/ML (ref 232–1245)

## 2022-02-03 LAB — ZINC: 87.3 UG/DL (ref 60–120)

## 2022-10-10 ENCOUNTER — HOSPITAL ENCOUNTER (OUTPATIENT)
Dept: MAMMOGRAPHY | Age: 72
Discharge: HOME | End: 2022-10-12
Payer: MEDICARE

## 2022-10-10 DIAGNOSIS — Z12.31 VISIT FOR SCREENING MAMMOGRAM: ICD-10-CM

## 2022-10-10 PROCEDURE — 77063 BREAST TOMOSYNTHESIS BI: CPT

## 2023-11-20 ENCOUNTER — APPOINTMENT (OUTPATIENT)
Dept: GENERAL RADIOLOGY | Age: 73
DRG: 177 | End: 2023-11-20
Payer: MEDICARE

## 2023-11-20 ENCOUNTER — HOSPITAL ENCOUNTER (INPATIENT)
Age: 73
LOS: 3 days | Discharge: HOME | DRG: 177 | End: 2023-11-23
Attending: EMERGENCY MEDICINE | Admitting: INTERNAL MEDICINE
Payer: MEDICARE

## 2023-11-20 ENCOUNTER — APPOINTMENT (OUTPATIENT)
Dept: CT IMAGING | Age: 73
DRG: 177 | End: 2023-11-20
Payer: MEDICARE

## 2023-11-20 DIAGNOSIS — R53.83 OTHER FATIGUE: ICD-10-CM

## 2023-11-20 DIAGNOSIS — E86.0 DEHYDRATION: ICD-10-CM

## 2023-11-20 DIAGNOSIS — U07.1 COVID-19: Primary | ICD-10-CM

## 2023-11-20 PROBLEM — E03.9 HYPOTHYROIDISM: Status: ACTIVE | Noted: 2018-11-01

## 2023-11-20 PROBLEM — M81.0 AGE-RELATED OSTEOPOROSIS WITHOUT CURRENT PATHOLOGICAL FRACTURE: Status: ACTIVE | Noted: 2023-11-20

## 2023-11-20 PROBLEM — M19.049 ARTHRITIS OF HAND: Status: ACTIVE | Noted: 2023-11-20

## 2023-11-20 PROBLEM — R32 URINARY INCONTINENCE: Status: ACTIVE | Noted: 2023-11-20

## 2023-11-20 PROBLEM — K57.90 DIVERTICULOSIS: Status: ACTIVE | Noted: 2023-11-20

## 2023-11-20 PROBLEM — M79.7 PRIMARY FIBROMYALGIA SYNDROME: Status: ACTIVE | Noted: 2018-11-01

## 2023-11-20 PROBLEM — N17.9 AKI (ACUTE KIDNEY INJURY) (HCC): Status: ACTIVE | Noted: 2023-11-20

## 2023-11-20 PROBLEM — D64.9 ANEMIA: Status: ACTIVE | Noted: 2023-06-06

## 2023-11-20 PROBLEM — R19.7 DIARRHEA: Status: ACTIVE | Noted: 2023-11-20

## 2023-11-20 PROBLEM — K59.00 CONSTIPATION: Status: ACTIVE | Noted: 2023-11-20

## 2023-11-20 PROBLEM — E78.5 HLD (HYPERLIPIDEMIA): Status: ACTIVE | Noted: 2023-11-20

## 2023-11-20 LAB
ALBUMIN SERPL-MCNC: 3.8 G/DL (ref 3.5–5.2)
ALBUMIN/GLOB SERPL: 1.5 {RATIO} (ref 1–2.5)
ALP SERPL-CCNC: 47 U/L (ref 35–104)
ALT SERPL-CCNC: 18 U/L (ref 5–33)
ANION GAP SERPL CALCULATED.3IONS-SCNC: 19 MMOL/L (ref 9–17)
AST SERPL-CCNC: 27 U/L
BASOPHILS # BLD: <0.03 K/UL (ref 0–0.2)
BASOPHILS NFR BLD: 0 % (ref 0–2)
BILIRUB SERPL-MCNC: 0.4 MG/DL (ref 0.3–1.2)
BUN SERPL-MCNC: 13 MG/DL (ref 8–23)
CALCIUM SERPL-MCNC: 8.5 MG/DL (ref 8.6–10.4)
CHLORIDE SERPL-SCNC: 103 MMOL/L (ref 98–107)
CO2 SERPL-SCNC: 16 MMOL/L (ref 20–31)
CREAT SERPL-MCNC: 1.1 MG/DL (ref 0.5–0.9)
CRP SERPL HS-MCNC: 7.9 MG/L (ref 0–5)
EOSINOPHIL # BLD: 0.12 K/UL (ref 0–0.44)
EOSINOPHILS RELATIVE PERCENT: 2 % (ref 1–4)
ERYTHROCYTE [DISTWIDTH] IN BLOOD BY AUTOMATED COUNT: 15.9 % (ref 11.8–14.4)
FLUAV AG SPEC QL: NEGATIVE
FLUBV AG SPEC QL: NEGATIVE
GFR SERPL CREATININE-BSD FRML MDRD: 52 ML/MIN/1.73M2
GLUCOSE BLD-MCNC: 229 MG/DL (ref 65–105)
GLUCOSE SERPL-MCNC: 244 MG/DL (ref 70–99)
HCT VFR BLD AUTO: 22.7 % (ref 36.3–47.1)
HGB BLD-MCNC: 7.4 G/DL (ref 11.9–15.1)
IMM GRANULOCYTES # BLD AUTO: 0.03 K/UL (ref 0–0.3)
IMM GRANULOCYTES NFR BLD: 1 %
LIPASE SERPL-CCNC: 27 U/L (ref 13–60)
LYMPHOCYTES NFR BLD: 1.14 K/UL (ref 1.1–3.7)
LYMPHOCYTES RELATIVE PERCENT: 21 % (ref 24–43)
MAGNESIUM SERPL-MCNC: 1.8 MG/DL (ref 1.6–2.6)
MCH RBC QN AUTO: 31 PG (ref 25.2–33.5)
MCHC RBC AUTO-ENTMCNC: 32.6 G/DL (ref 28.4–34.8)
MCV RBC AUTO: 95 FL (ref 82.6–102.9)
MONOCYTES NFR BLD: 0.4 K/UL (ref 0.1–1.2)
MONOCYTES NFR BLD: 7 % (ref 3–12)
NEUTROPHILS NFR BLD: 69 % (ref 36–65)
NEUTS SEG NFR BLD: 3.82 K/UL (ref 1.5–8.1)
NRBC BLD-RTO: 0 PER 100 WBC
PLATELET # BLD AUTO: 211 K/UL (ref 138–453)
PMV BLD AUTO: 10.4 FL (ref 8.1–13.5)
POTASSIUM SERPL-SCNC: 3.5 MMOL/L (ref 3.7–5.3)
PROT SERPL-MCNC: 6.4 G/DL (ref 6.4–8.3)
RBC # BLD AUTO: 2.39 M/UL (ref 3.95–5.11)
RBC # BLD: ABNORMAL 10*6/UL
SARS-COV-2 RDRP RESP QL NAA+PROBE: DETECTED
SODIUM SERPL-SCNC: 138 MMOL/L (ref 135–144)
SPECIMEN DESCRIPTION: ABNORMAL
T4 FREE SERPL-MCNC: 1.1 NG/DL (ref 0.9–1.7)
TROPONIN I SERPL HS-MCNC: 12 NG/L (ref 0–14)
TROPONIN I SERPL HS-MCNC: 16 NG/L (ref 0–14)
TSH SERPL DL<=0.05 MIU/L-ACNC: 6.56 UIU/ML (ref 0.3–5)
WBC OTHER # BLD: 5.5 K/UL (ref 3.5–11.3)

## 2023-11-20 PROCEDURE — 2060000000 HC ICU INTERMEDIATE R&B

## 2023-11-20 PROCEDURE — 85025 COMPLETE CBC W/AUTO DIFF WBC: CPT

## 2023-11-20 PROCEDURE — 86850 RBC ANTIBODY SCREEN: CPT

## 2023-11-20 PROCEDURE — 70450 CT HEAD/BRAIN W/O DYE: CPT

## 2023-11-20 PROCEDURE — 96374 THER/PROPH/DIAG INJ IV PUSH: CPT

## 2023-11-20 PROCEDURE — 87635 SARS-COV-2 COVID-19 AMP PRB: CPT

## 2023-11-20 PROCEDURE — 2580000003 HC RX 258

## 2023-11-20 PROCEDURE — 36415 COLL VENOUS BLD VENIPUNCTURE: CPT

## 2023-11-20 PROCEDURE — 6370000000 HC RX 637 (ALT 250 FOR IP)

## 2023-11-20 PROCEDURE — 6360000002 HC RX W HCPCS: Performed by: STUDENT IN AN ORGANIZED HEALTH CARE EDUCATION/TRAINING PROGRAM

## 2023-11-20 PROCEDURE — 86901 BLOOD TYPING SEROLOGIC RH(D): CPT

## 2023-11-20 PROCEDURE — 83690 ASSAY OF LIPASE: CPT

## 2023-11-20 PROCEDURE — 84484 ASSAY OF TROPONIN QUANT: CPT

## 2023-11-20 PROCEDURE — 86140 C-REACTIVE PROTEIN: CPT

## 2023-11-20 PROCEDURE — 93005 ELECTROCARDIOGRAM TRACING: CPT | Performed by: STUDENT IN AN ORGANIZED HEALTH CARE EDUCATION/TRAINING PROGRAM

## 2023-11-20 PROCEDURE — 80053 COMPREHEN METABOLIC PANEL: CPT

## 2023-11-20 PROCEDURE — 87804 INFLUENZA ASSAY W/OPTIC: CPT

## 2023-11-20 PROCEDURE — 86920 COMPATIBILITY TEST SPIN: CPT

## 2023-11-20 PROCEDURE — 6370000000 HC RX 637 (ALT 250 FOR IP): Performed by: INTERNAL MEDICINE

## 2023-11-20 PROCEDURE — 6360000002 HC RX W HCPCS

## 2023-11-20 PROCEDURE — 86900 BLOOD TYPING SEROLOGIC ABO: CPT

## 2023-11-20 PROCEDURE — 84443 ASSAY THYROID STIM HORMONE: CPT

## 2023-11-20 PROCEDURE — 99285 EMERGENCY DEPT VISIT HI MDM: CPT

## 2023-11-20 PROCEDURE — 2580000003 HC RX 258: Performed by: STUDENT IN AN ORGANIZED HEALTH CARE EDUCATION/TRAINING PROGRAM

## 2023-11-20 PROCEDURE — 84439 ASSAY OF FREE THYROXINE: CPT

## 2023-11-20 PROCEDURE — 71045 X-RAY EXAM CHEST 1 VIEW: CPT

## 2023-11-20 PROCEDURE — 83735 ASSAY OF MAGNESIUM: CPT

## 2023-11-20 PROCEDURE — 82947 ASSAY GLUCOSE BLOOD QUANT: CPT

## 2023-11-20 RX ORDER — OXYBUTYNIN CHLORIDE 10 MG/1
10 TABLET, EXTENDED RELEASE ORAL DAILY
Status: DISCONTINUED | OUTPATIENT
Start: 2023-11-20 | End: 2023-11-23 | Stop reason: HOSPADM

## 2023-11-20 RX ORDER — ONDANSETRON 2 MG/ML
4 INJECTION INTRAMUSCULAR; INTRAVENOUS EVERY 6 HOURS PRN
Status: DISCONTINUED | OUTPATIENT
Start: 2023-11-20 | End: 2023-11-23 | Stop reason: HOSPADM

## 2023-11-20 RX ORDER — LABETALOL HYDROCHLORIDE 5 MG/ML
10 INJECTION, SOLUTION INTRAVENOUS EVERY 4 HOURS PRN
Status: DISCONTINUED | OUTPATIENT
Start: 2023-11-20 | End: 2023-11-23 | Stop reason: HOSPADM

## 2023-11-20 RX ORDER — SODIUM CHLORIDE 9 MG/ML
INJECTION, SOLUTION INTRAVENOUS PRN
Status: DISCONTINUED | OUTPATIENT
Start: 2023-11-20 | End: 2023-11-23 | Stop reason: HOSPADM

## 2023-11-20 RX ORDER — POTASSIUM CHLORIDE 20 MEQ/1
40 TABLET, EXTENDED RELEASE ORAL PRN
Status: DISCONTINUED | OUTPATIENT
Start: 2023-11-20 | End: 2023-11-23 | Stop reason: HOSPADM

## 2023-11-20 RX ORDER — HYDROCHLOROTHIAZIDE 25 MG/1
12.5 TABLET ORAL DAILY
Status: DISCONTINUED | OUTPATIENT
Start: 2023-11-20 | End: 2023-11-20

## 2023-11-20 RX ORDER — SODIUM CHLORIDE, SODIUM LACTATE, POTASSIUM CHLORIDE, AND CALCIUM CHLORIDE .6; .31; .03; .02 G/100ML; G/100ML; G/100ML; G/100ML
1000 INJECTION, SOLUTION INTRAVENOUS ONCE
Status: COMPLETED | OUTPATIENT
Start: 2023-11-20 | End: 2023-11-20

## 2023-11-20 RX ORDER — PANTOPRAZOLE SODIUM 40 MG/1
40 TABLET, DELAYED RELEASE ORAL DAILY
Status: DISCONTINUED | OUTPATIENT
Start: 2023-11-20 | End: 2023-11-23 | Stop reason: HOSPADM

## 2023-11-20 RX ORDER — POLYETHYLENE GLYCOL 3350 17 G/17G
17 POWDER, FOR SOLUTION ORAL DAILY PRN
Status: DISCONTINUED | OUTPATIENT
Start: 2023-11-20 | End: 2023-11-23 | Stop reason: HOSPADM

## 2023-11-20 RX ORDER — ATORVASTATIN CALCIUM 40 MG/1
40 TABLET, FILM COATED ORAL DAILY
Status: DISCONTINUED | OUTPATIENT
Start: 2023-11-20 | End: 2023-11-23

## 2023-11-20 RX ORDER — POTASSIUM CHLORIDE 7.45 MG/ML
10 INJECTION INTRAVENOUS PRN
Status: DISCONTINUED | OUTPATIENT
Start: 2023-11-20 | End: 2023-11-23 | Stop reason: HOSPADM

## 2023-11-20 RX ORDER — MAGNESIUM SULFATE IN WATER 40 MG/ML
2000 INJECTION, SOLUTION INTRAVENOUS PRN
Status: DISCONTINUED | OUTPATIENT
Start: 2023-11-20 | End: 2023-11-23 | Stop reason: HOSPADM

## 2023-11-20 RX ORDER — SODIUM CHLORIDE 0.9 % (FLUSH) 0.9 %
5-40 SYRINGE (ML) INJECTION PRN
Status: DISCONTINUED | OUTPATIENT
Start: 2023-11-20 | End: 2023-11-23 | Stop reason: HOSPADM

## 2023-11-20 RX ORDER — HEPARIN SODIUM 5000 [USP'U]/ML
5000 INJECTION, SOLUTION INTRAVENOUS; SUBCUTANEOUS EVERY 8 HOURS SCHEDULED
Status: DISCONTINUED | OUTPATIENT
Start: 2023-11-20 | End: 2023-11-20

## 2023-11-20 RX ORDER — SODIUM CHLORIDE, SODIUM LACTATE, POTASSIUM CHLORIDE, CALCIUM CHLORIDE 600; 310; 30; 20 MG/100ML; MG/100ML; MG/100ML; MG/100ML
INJECTION, SOLUTION INTRAVENOUS CONTINUOUS
Status: DISCONTINUED | OUTPATIENT
Start: 2023-11-20 | End: 2023-11-22

## 2023-11-20 RX ORDER — LEVOTHYROXINE SODIUM 0.05 MG/1
50 TABLET ORAL DAILY
Status: DISCONTINUED | OUTPATIENT
Start: 2023-11-20 | End: 2023-11-23 | Stop reason: HOSPADM

## 2023-11-20 RX ORDER — 0.9 % SODIUM CHLORIDE 0.9 %
1000 INTRAVENOUS SOLUTION INTRAVENOUS ONCE
Status: COMPLETED | OUTPATIENT
Start: 2023-11-20 | End: 2023-11-20

## 2023-11-20 RX ORDER — SODIUM CHLORIDE 0.9 % (FLUSH) 0.9 %
5-40 SYRINGE (ML) INJECTION EVERY 12 HOURS SCHEDULED
Status: DISCONTINUED | OUTPATIENT
Start: 2023-11-20 | End: 2023-11-23 | Stop reason: HOSPADM

## 2023-11-20 RX ORDER — ONDANSETRON 4 MG/1
4 TABLET, ORALLY DISINTEGRATING ORAL EVERY 8 HOURS PRN
Status: DISCONTINUED | OUTPATIENT
Start: 2023-11-20 | End: 2023-11-23 | Stop reason: HOSPADM

## 2023-11-20 RX ORDER — LOSARTAN POTASSIUM 50 MG/1
100 TABLET ORAL DAILY
Status: DISCONTINUED | OUTPATIENT
Start: 2023-11-20 | End: 2023-11-20

## 2023-11-20 RX ORDER — ONDANSETRON 2 MG/ML
4 INJECTION INTRAMUSCULAR; INTRAVENOUS ONCE
Status: COMPLETED | OUTPATIENT
Start: 2023-11-20 | End: 2023-11-20

## 2023-11-20 RX ORDER — LOSARTAN POTASSIUM AND HYDROCHLOROTHIAZIDE 12.5; 1 MG/1; MG/1
1 TABLET ORAL DAILY
Status: DISCONTINUED | OUTPATIENT
Start: 2023-11-20 | End: 2023-11-20 | Stop reason: CLARIF

## 2023-11-20 RX ADMIN — POTASSIUM BICARBONATE 40 MEQ: 782 TABLET, EFFERVESCENT ORAL at 21:49

## 2023-11-20 RX ADMIN — SODIUM CHLORIDE 1000 ML: 9 INJECTION, SOLUTION INTRAVENOUS at 13:31

## 2023-11-20 RX ADMIN — SODIUM CHLORIDE, PRESERVATIVE FREE 10 ML: 5 INJECTION INTRAVENOUS at 21:52

## 2023-11-20 RX ADMIN — OXYBUTYNIN CHLORIDE 10 MG: 10 TABLET, EXTENDED RELEASE ORAL at 21:55

## 2023-11-20 RX ADMIN — ATORVASTATIN CALCIUM 40 MG: 40 TABLET, FILM COATED ORAL at 18:11

## 2023-11-20 RX ADMIN — LEVOTHYROXINE SODIUM 50 MCG: 50 TABLET ORAL at 18:11

## 2023-11-20 RX ADMIN — PANTOPRAZOLE SODIUM 40 MG: 40 TABLET, DELAYED RELEASE ORAL at 18:11

## 2023-11-20 RX ADMIN — ONDANSETRON 4 MG: 2 INJECTION INTRAMUSCULAR; INTRAVENOUS at 13:32

## 2023-11-20 RX ADMIN — SODIUM CHLORIDE, POTASSIUM CHLORIDE, SODIUM LACTATE AND CALCIUM CHLORIDE 1000 ML: 600; 310; 30; 20 INJECTION, SOLUTION INTRAVENOUS at 18:11

## 2023-11-20 RX ADMIN — NIRMATRELVIR AND RITONAVIR 2 TABLET: KIT at 21:50

## 2023-11-20 RX ADMIN — SODIUM CHLORIDE, POTASSIUM CHLORIDE, SODIUM LACTATE AND CALCIUM CHLORIDE 1000 ML: 600; 310; 30; 20 INJECTION, SOLUTION INTRAVENOUS at 19:38

## 2023-11-20 ASSESSMENT — ENCOUNTER SYMPTOMS
STRIDOR: 0
COUGH: 1
RHINORRHEA: 1
WHEEZING: 0
COLOR CHANGE: 0
VOMITING: 1
VOMITING: 0
SHORTNESS OF BREATH: 1
COLOR CHANGE: 1
NAUSEA: 0
NAUSEA: 1
COUGH: 0
SORE THROAT: 1
DIARRHEA: 1
BLOOD IN STOOL: 1

## 2023-11-20 ASSESSMENT — PAIN SCALES - GENERAL: PAINLEVEL_OUTOF10: 4

## 2023-11-20 ASSESSMENT — PAIN DESCRIPTION - LOCATION: LOCATION: HEAD

## 2023-11-20 ASSESSMENT — PAIN DESCRIPTION - DESCRIPTORS: DESCRIPTORS: ACHING

## 2023-11-20 NOTE — CONSULTS
[facility] - Initial Consult Note COVID 19 Patient  Today's Date and Time: 11/20/2023, 6:13 PM    Impression :     COVID 19 Confirmed Infection  Covid tests:  Positive Covid Test Date:  11-20-23  Vaccination Status: Vaccinated x 5 doses  Hypoxia  Advanced age  Recommendations:   Antibiotic treatment:  Monitor off antibiotics  Covid Rx:    Remdesivir: Cab be employed if Problems with Paxlovid. Decadron: Not indicated  Actemra: Not indicated  Paxlovid: Requested 11-20-23  Monitor CRP      Medical Decision Making/Summary/Discussion:11/20/2023     Patient admitted with COVID 19 infection    Infection Control Recommendations   [place] Precautions  Airborne isolation  Droplet Plus Isolation. Stop date 11-30-23    Antimicrobial Stewardship Recommendations     Discontinuation of therapy  Coordination of Outpatient Care:   Estimated Length of IV antimicrobials:TBD  Patient will need Midline Catheter Insertion: TBD  Patient will need PICC line Insertion: No  Patient will need: Home IV , [address] [place],  SNF,  LTAC:TBD  Patient will need outpatient wound care:No    Chief complaint/reason for consultation:   Concern for COVID infection      History of Present Illness:   Ermelinda Mi is a 74y.o.-year-old  female who was initially admitted on 11/20/2023. Patient seen at the request of Dr. Shahid Duran. INITIAL HISTORY:    Patient with a past Hx of essential HTN, hyperlipidemia, thyroid disease. Patient presented through ER with complaints of fatigue, nausea, vomiting, diarrhea, headache, cough, earache and shortness of breath present for 4-5 days. She reported exposure to a person with Covid. Her ER test was positive for Covid. She had previously received 5 vaccinations against Covid. She was found to be hypoxic. She required nasal 02 at 3-4 L/min to normalize her 02 saturations.     Patient admitted because of concerns with COVID 19.    CURRENT EVALUATION : 11/20/2023  BP

## 2023-11-20 NOTE — ED NOTES
ED to inpatient nurses report      Chief Complaint:  Chief Complaint   Patient presents with    Headache     Headache    Shortness of Breath     Per EMS     Present to ED from: Home    MOA:     LOC: alert and orientated to name, place, date  Mobility: Requires assistance * 1  Oxygen Baseline: room air    Current needs required: O2 2L min per NC prn   Pending ED orders: Admission bed, 2nd Trop result  Present condition: Improved, more talkative since receiving fluids and zofran    Why did the patient come to the ED?  called PCP for appt due to pt c/o cough, earache, headache and c/o shortness of breath. PCP told  to go to ER  What is the plan? Admit for Obs of Covid  Any procedures or intervention occur? IV, NS bolus, Zofran, CT head, Monitor, Labs  Any safety concerns? ? Mental Status:       Psych Assessment:      Vital signs   Vitals:    11/20/23 1313 11/20/23 1314 11/20/23 1322 11/20/23 1324   BP: (!) 80/42   (!) 116/54   Pulse: 87 85  86   Resp: 21 14  12   SpO2:  100% 100% 100%        Vitals:  Patient Vitals for the past 24 hrs:   BP Pulse Resp SpO2   11/20/23 1324 (!) 116/54 86 12 100 %   11/20/23 1322 -- -- -- 100 %   11/20/23 1314 -- 85 14 100 %   11/20/23 1313 (!) 80/42 87 21 --      Visit Vitals  BP (!) 116/54   Pulse 86   Resp 12   SpO2 100%        LDAs:   Peripheral IV 11/20/23 Posterior;Right Hand (Active)   Site Assessment Clean, dry & intact 11/20/23 1331   Line Status Brisk blood return;Normal saline locked;Specimen collected; Flushed 11/20/23 1331   Phlebitis Assessment No symptoms 11/20/23 1331   Infiltration Assessment 0 11/20/23 1331   Dressing Status New dressing applied;Clean, dry & intact 11/20/23 1331   Dressing Type Transparent 11/20/23 1331   Dressing Intervention New 11/20/23 1331       Peripheral IV 11/20/23 Left;Upper Antecubital (Active)   Site Assessment Clean, dry & intact 11/20/23 1400   Line Status Blood return noted; Flushed;Normal saline locked;Specimen collected

## 2023-11-20 NOTE — H&P
[address]     Department of Internal Medicine - Staff Internal Medicine Teaching Service          ADMISSION NOTE/HISTORY AND PHYSICAL EXAMINATION   Date: 11/20/2023  Patient Name: Vick Koch  Date of admission: 11/20/2023  1:12 PM  YOB: 1950  PCP: Becky Peres MD  History Obtained From:  patient, family and electronic medical record    CHIEF COMPLAINT     Chief complaint: Headache and shortness of breath    HISTORY OF PRESENTING ILLNESS     The patient is a pleasant 74 y.o. female presents with a chief complaint of generalized fatigue, nausea, vomiting, diarrhea, headache and shortness of breath for past 4 days. She has a past medical history significant for hyperlipidemia, hypertension and hypothyroidism. She was in the usual state of health few days ago when she started experiencing generalized weakness, fatigue and nausea. It was gradual in onset and progressively worsened. Patient endorses contact with a friend who was recovering. She states that it all started as flulike symptoms and gradually progressed towards shortness of breath. Patient has been previously vaccinated against COVID but the last booster was almost 1 year ago. She endorses decreased appetite since Thursday. Patient also has not taken her medications for other chronic issues. She tested positive for COVID in the ED on 11/20/2023. She complains of bloody bowel movements for 2 days. As per patient, bowel movement is not associated with any pain and the blood is mixed with the stool. She does have a history of hemorrhoids. In the ED, her CMP was significant for hypokalemia K 3.5, bicarbonate 16, BUN 13 and creatinine 1.1. It was given the picture of metabolic acidosis due to diarrhea. RFT's were concerning for LALA due to dehydration. Her antihypertensives were held due to LALA. Her TSH was elevated at 6.56 with T4 free 1.1.   Patient usually takes Synthroid 50 mcg daily for

## 2023-11-20 NOTE — ED NOTES
Transported to  on stretcher by RN  No change in status, resting quietly     Cyndy Martinez [place]  11/20/23 2694

## 2023-11-20 NOTE — PLAN OF CARE
78-year-old female with a past medical history significant for hypertension, hypokalemia, acquired hypothyroidism, fibromyalgia, GERD, obstructive sleep apnea, hyperlipidemia, urinary incontinence, diverticulosis and anemia. Patient presented with fatigue, nausea, vomiting, diarrhea since Thursday. Patient had shortness of breath starting from today. She states that she has been around friends who had had similar symptoms and is unsure whether or not they tested positive for COVID. Patient did test positive for COVID-19 in the emergency department on 11/20/2023. Chest x-ray done in the ED showing perihilar focal infiltrate which can be seen with atypical or viral pneumonia. Patient has not been eating since not feeling well on Thursday. Upon examination she does appear dehydrated received 1 L of normal saline in the emergency department. TSH elevated at roughly 6. Patient states she has not taken any of her medications since Thursday she has had decreased appetite. Patient complains of generalized fatigue and body aches which worsened today prompting her to come to the emergency department for further evaluation. Patient did have a hemoglobin of 7.4 which was previously 14 and 2022. Patient also complains of bloody bowel movements bright red blood with associated diarrhea for the past 2 days. She does have a history of hemorrhoids. Assessment  COVID-19 pneumonia  Acute kidney injury  Hypothyroidism  Anemia  Essential hypertension  Active sleep apnea  GERD  Hyperlipidemia  Urinary incontinence  Diverticulosis  Nausea/vomiting  Diarrhea  Bright red blood per rectum secondary to hemorrhoids? Plan  Hold lisinopril hydrochlorothiazide in the setting of acute kidney injury. We will control blood pressure with IV labetalol as needed for blood pressure greater than 160. Monitor urine output   Monitor H&H every 6 hours  We will hold off on chemical DVT prophylaxis at this time. EPC cuffs.   We will hold off on adding steroids until infectious disease evaluation in the setting of COVID-19 pneumonia  Monitor CRP   Protonix   Consider GI consultation if anemia worsens in the setting of bright red blood per rectum  Resume home medications  Synthroid will likely need dose adjustments as TSH was elevated  IV hydration with LR boluses x2.   Patient did receive 1 L of normal saline in the ED  Okay for regular diet

## 2023-11-20 NOTE — ED NOTES
The following labs were labeled with appropriate pt sticker and tubed to lab: by me    [x] Blue     [x] Lavender   [] on ice  [x] Green/yellow  [x] Green/black [] on ice  [] Ahmad Yazmin  [] on ice  [] Yellow  [] Red  [] Pink  [] Type/ Screen  [] ABG  [] VBG    [] COVID-19 swab    [] Rapid  [] PCR  [] Flu swab  [] Peds Viral Panel     [] Urine Sample  [] Fecal Sample  [] Pelvic Cultures  [] Blood Cultures  [] X 2  [] STREP Cultures       Adrian Owusu RN  11/20/23 7141

## 2023-11-20 NOTE — ED NOTES
O2 decreased to 2L, SaO2 continues at 100%   arrived   states that he called doctor office because patient has cough, headache and earache and pt felt short of breath  Doctor office told  to take patient to [facility], [address] [place] [address]  11/20/23 0602

## 2023-11-20 NOTE — ED NOTES
Patient verbalizes headache, denies any other pain, then holds abdomen and is moaning  Patient verbalizes \"I dont know what is hurting\"  Patient was changed to 4L then 3L NC, SaO2 100%  No shortness of breath or work of breathing noted  Hilary Delgado with US IV at bedside     Ayden Moni, [address] [place] [address]  11/20/23 4019

## 2023-11-20 NOTE — ED NOTES
Patient presents with headache and difficulty breathing  EMS states patient walked to ambulance  Patient is a/o  On NRB 15L min  Denies chest pain  Verbalizes feels short of breath   SaO2 100% on NRB  Patient placed on cardiac monitor, BP cuff and pulse ox. Alarms set.        Adrian Owusu RN  11/20/23 9869

## 2023-11-21 ENCOUNTER — APPOINTMENT (OUTPATIENT)
Dept: NUCLEAR MEDICINE | Age: 73
DRG: 177 | End: 2023-11-21
Attending: INTERNAL MEDICINE
Payer: MEDICARE

## 2023-11-21 ENCOUNTER — APPOINTMENT (OUTPATIENT)
Dept: NUCLEAR MEDICINE | Age: 73
DRG: 177 | End: 2023-11-21
Payer: MEDICARE

## 2023-11-21 ENCOUNTER — APPOINTMENT (OUTPATIENT)
Dept: CT IMAGING | Age: 73
DRG: 177 | End: 2023-11-21
Payer: MEDICARE

## 2023-11-21 LAB
ANION GAP SERPL CALCULATED.3IONS-SCNC: 5 MMOL/L (ref 9–17)
BASOPHILS # BLD: <0.03 K/UL (ref 0–0.2)
BASOPHILS # BLD: <0.03 K/UL (ref 0–0.2)
BASOPHILS NFR BLD: 0 % (ref 0–2)
BASOPHILS NFR BLD: 0 % (ref 0–2)
BILIRUB DIRECT SERPL-MCNC: 0.1 MG/DL
BILIRUB SERPL-MCNC: 0.3 MG/DL (ref 0.3–1.2)
BUN SERPL-MCNC: 10 MG/DL (ref 8–23)
CA-I BLD-SCNC: 1.1 MMOL/L (ref 1.13–1.33)
CALCIUM SERPL-MCNC: 7.7 MG/DL (ref 8.6–10.4)
CHLORIDE SERPL-SCNC: 109 MMOL/L (ref 98–107)
CO2 SERPL-SCNC: 24 MMOL/L (ref 20–31)
CREAT SERPL-MCNC: 0.6 MG/DL (ref 0.5–0.9)
EKG ATRIAL RATE: 86 BPM
EKG P AXIS: 12 DEGREES
EKG P-R INTERVAL: 112 MS
EKG Q-T INTERVAL: 432 MS
EKG QRS DURATION: 74 MS
EKG QTC CALCULATION (BAZETT): 516 MS
EKG R AXIS: -4 DEGREES
EKG T AXIS: 92 DEGREES
EKG VENTRICULAR RATE: 86 BPM
EOSINOPHIL # BLD: <0.03 K/UL (ref 0–0.44)
EOSINOPHIL # BLD: <0.03 K/UL (ref 0–0.44)
EOSINOPHILS RELATIVE PERCENT: 0 % (ref 1–4)
EOSINOPHILS RELATIVE PERCENT: 1 % (ref 1–4)
ERYTHROCYTE [DISTWIDTH] IN BLOOD BY AUTOMATED COUNT: 16.4 % (ref 11.8–14.4)
ERYTHROCYTE [DISTWIDTH] IN BLOOD BY AUTOMATED COUNT: 16.4 % (ref 11.8–14.4)
FERRITIN SERPL-MCNC: 36 NG/ML (ref 13–150)
GFR SERPL CREATININE-BSD FRML MDRD: >60 ML/MIN/1.73M2
GLUCOSE SERPL-MCNC: 149 MG/DL (ref 70–99)
HAPTOGLOB SERPL-MCNC: 191 MG/DL (ref 30–200)
HCT VFR BLD AUTO: 17.3 % (ref 36.3–47.1)
HCT VFR BLD AUTO: 17.9 % (ref 36.3–47.1)
HCT VFR BLD AUTO: 18.3 % (ref 36.3–47.1)
HCT VFR BLD AUTO: 18.5 % (ref 36.3–47.1)
HCT VFR BLD AUTO: 20.7 % (ref 36.3–47.1)
HCT VFR BLD AUTO: 26.3 % (ref 36.3–47.1)
HGB BLD-MCNC: 5.2 G/DL (ref 11.9–15.1)
HGB BLD-MCNC: 5.3 G/DL (ref 11.9–15.1)
HGB BLD-MCNC: 5.5 G/DL (ref 11.9–15.1)
HGB BLD-MCNC: 5.7 G/DL (ref 11.9–15.1)
HGB BLD-MCNC: 6.5 G/DL (ref 11.9–15.1)
HGB BLD-MCNC: 8.4 G/DL (ref 11.9–15.1)
IMM GRANULOCYTES # BLD AUTO: <0.03 K/UL (ref 0–0.3)
IMM GRANULOCYTES # BLD AUTO: <0.03 K/UL (ref 0–0.3)
IMM GRANULOCYTES NFR BLD: 0 %
IMM GRANULOCYTES NFR BLD: 0 %
IMM RETICS NFR: 25.9 % (ref 2.7–18.3)
IRON SATN MFR SERPL: 10 % (ref 20–55)
IRON SERPL-MCNC: 22 UG/DL (ref 37–145)
LDH SERPL-CCNC: 144 U/L (ref 135–214)
LYMPHOCYTES NFR BLD: 1.11 K/UL (ref 1.1–3.7)
LYMPHOCYTES NFR BLD: 1.4 K/UL (ref 1.1–3.7)
LYMPHOCYTES RELATIVE PERCENT: 35 % (ref 24–43)
LYMPHOCYTES RELATIVE PERCENT: 42 % (ref 24–43)
MCH RBC QN AUTO: 30.8 PG (ref 25.2–33.5)
MCH RBC QN AUTO: 31.4 PG (ref 25.2–33.5)
MCHC RBC AUTO-ENTMCNC: 30.6 G/DL (ref 28.4–34.8)
MCHC RBC AUTO-ENTMCNC: 30.8 G/DL (ref 28.4–34.8)
MCV RBC AUTO: 100 FL (ref 82.6–102.9)
MCV RBC AUTO: 102.4 FL (ref 82.6–102.9)
MONOCYTES NFR BLD: 0.23 K/UL (ref 0.1–1.2)
MONOCYTES NFR BLD: 0.31 K/UL (ref 0.1–1.2)
MONOCYTES NFR BLD: 10 % (ref 3–12)
MONOCYTES NFR BLD: 7 % (ref 3–12)
NEUTROPHILS NFR BLD: 50 % (ref 36–65)
NEUTROPHILS NFR BLD: 54 % (ref 36–65)
NEUTS SEG NFR BLD: 1.66 K/UL (ref 1.5–8.1)
NEUTS SEG NFR BLD: 1.73 K/UL (ref 1.5–8.1)
NRBC BLD-RTO: 0.6 PER 100 WBC
NRBC BLD-RTO: 1.6 PER 100 WBC
PLATELET # BLD AUTO: 104 K/UL (ref 138–453)
PLATELET # BLD AUTO: 165 K/UL (ref 138–453)
PMV BLD AUTO: 10.2 FL (ref 8.1–13.5)
PMV BLD AUTO: 10.8 FL (ref 8.1–13.5)
POTASSIUM SERPL-SCNC: 3.7 MMOL/L (ref 3.7–5.3)
RBC # BLD AUTO: 1.69 M/UL (ref 3.95–5.11)
RBC # BLD AUTO: 1.85 M/UL (ref 3.95–5.11)
RBC # BLD: ABNORMAL 10*6/UL
RBC # BLD: ABNORMAL 10*6/UL
RETIC HEMOGLOBIN: 26.8 PG (ref 28.2–35.7)
RETICS # AUTO: 0.15 M/UL (ref 0.03–0.08)
RETICS/RBC NFR AUTO: 8.3 % (ref 0.5–1.9)
SODIUM SERPL-SCNC: 138 MMOL/L (ref 135–144)
TIBC SERPL-MCNC: 213 UG/DL (ref 250–450)
UNSATURATED IRON BINDING CAPACITY: 191 UG/DL (ref 112–347)
WBC OTHER # BLD: 3.2 K/UL (ref 3.5–11.3)
WBC OTHER # BLD: 3.3 K/UL (ref 3.5–11.3)

## 2023-11-21 PROCEDURE — 85018 HEMOGLOBIN: CPT

## 2023-11-21 PROCEDURE — P9016 RBC LEUKOCYTES REDUCED: HCPCS

## 2023-11-21 PROCEDURE — 99223 1ST HOSP IP/OBS HIGH 75: CPT | Performed by: INTERNAL MEDICINE

## 2023-11-21 PROCEDURE — 6370000000 HC RX 637 (ALT 250 FOR IP): Performed by: INTERNAL MEDICINE

## 2023-11-21 PROCEDURE — 83615 LACTATE (LD) (LDH) ENZYME: CPT

## 2023-11-21 PROCEDURE — 82330 ASSAY OF CALCIUM: CPT

## 2023-11-21 PROCEDURE — 30233N1 TRANSFUSION OF NONAUTOLOGOUS RED BLOOD CELLS INTO PERIPHERAL VEIN, PERCUTANEOUS APPROACH: ICD-10-PCS

## 2023-11-21 PROCEDURE — 36430 TRANSFUSION BLD/BLD COMPNT: CPT

## 2023-11-21 PROCEDURE — 82728 ASSAY OF FERRITIN: CPT

## 2023-11-21 PROCEDURE — 6370000000 HC RX 637 (ALT 250 FOR IP)

## 2023-11-21 PROCEDURE — 85045 AUTOMATED RETICULOCYTE COUNT: CPT

## 2023-11-21 PROCEDURE — 83550 IRON BINDING TEST: CPT

## 2023-11-21 PROCEDURE — 82248 BILIRUBIN DIRECT: CPT

## 2023-11-21 PROCEDURE — 93010 ELECTROCARDIOGRAM REPORT: CPT | Performed by: INTERNAL MEDICINE

## 2023-11-21 PROCEDURE — 85014 HEMATOCRIT: CPT

## 2023-11-21 PROCEDURE — 78278 ACUTE GI BLOOD LOSS IMAGING: CPT

## 2023-11-21 PROCEDURE — 2060000000 HC ICU INTERMEDIATE R&B

## 2023-11-21 PROCEDURE — 2580000003 HC RX 258: Performed by: INTERNAL MEDICINE

## 2023-11-21 PROCEDURE — 3430000000 HC RX DIAGNOSTIC RADIOPHARMACEUTICAL: Performed by: INTERNAL MEDICINE

## 2023-11-21 PROCEDURE — 82247 BILIRUBIN TOTAL: CPT

## 2023-11-21 PROCEDURE — 83010 ASSAY OF HAPTOGLOBIN QUANT: CPT

## 2023-11-21 PROCEDURE — 74174 CTA ABD&PLVS W/CONTRAST: CPT

## 2023-11-21 PROCEDURE — 83540 ASSAY OF IRON: CPT

## 2023-11-21 PROCEDURE — 85025 COMPLETE CBC W/AUTO DIFF WBC: CPT

## 2023-11-21 PROCEDURE — 36415 COLL VENOUS BLD VENIPUNCTURE: CPT

## 2023-11-21 PROCEDURE — 99232 SBSQ HOSP IP/OBS MODERATE 35: CPT | Performed by: INTERNAL MEDICINE

## 2023-11-21 PROCEDURE — 6360000004 HC RX CONTRAST MEDICATION: Performed by: INTERNAL MEDICINE

## 2023-11-21 PROCEDURE — 80048 BASIC METABOLIC PNL TOTAL CA: CPT

## 2023-11-21 PROCEDURE — 2580000003 HC RX 258

## 2023-11-21 PROCEDURE — A9560 TC99M LABELED RBC: HCPCS | Performed by: INTERNAL MEDICINE

## 2023-11-21 RX ORDER — SODIUM CHLORIDE 9 MG/ML
INJECTION, SOLUTION INTRAVENOUS PRN
Status: DISCONTINUED | OUTPATIENT
Start: 2023-11-21 | End: 2023-11-23 | Stop reason: HOSPADM

## 2023-11-21 RX ORDER — SODIUM CHLORIDE 0.9 % (FLUSH) 0.9 %
10 SYRINGE (ML) INJECTION PRN
Status: DISCONTINUED | OUTPATIENT
Start: 2023-11-21 | End: 2023-11-23 | Stop reason: HOSPADM

## 2023-11-21 RX ORDER — BUTALBITAL, ACETAMINOPHEN AND CAFFEINE 50; 325; 40 MG/1; MG/1; MG/1
1 TABLET ORAL ONCE
Status: DISCONTINUED | OUTPATIENT
Start: 2023-11-21 | End: 2023-11-23 | Stop reason: HOSPADM

## 2023-11-21 RX ADMIN — SODIUM CHLORIDE, PRESERVATIVE FREE 10 ML: 5 INJECTION INTRAVENOUS at 13:45

## 2023-11-21 RX ADMIN — SODIUM CHLORIDE, PRESERVATIVE FREE 10 ML: 5 INJECTION INTRAVENOUS at 20:20

## 2023-11-21 RX ADMIN — SODIUM CHLORIDE, PRESERVATIVE FREE 10 ML: 5 INJECTION INTRAVENOUS at 08:38

## 2023-11-21 RX ADMIN — SODIUM CHLORIDE, POTASSIUM CHLORIDE, SODIUM LACTATE AND CALCIUM CHLORIDE: 600; 310; 30; 20 INJECTION, SOLUTION INTRAVENOUS at 22:45

## 2023-11-21 RX ADMIN — NIRMATRELVIR AND RITONAVIR 2 TABLET: KIT at 06:49

## 2023-11-21 RX ADMIN — NIRMATRELVIR AND RITONAVIR 3 TABLET: KIT at 21:44

## 2023-11-21 RX ADMIN — IOPAMIDOL 100 ML: 755 INJECTION, SOLUTION INTRAVENOUS at 21:27

## 2023-11-21 RX ADMIN — OXYBUTYNIN CHLORIDE 10 MG: 10 TABLET, EXTENDED RELEASE ORAL at 08:38

## 2023-11-21 RX ADMIN — PANTOPRAZOLE SODIUM 40 MG: 40 TABLET, DELAYED RELEASE ORAL at 08:38

## 2023-11-21 RX ADMIN — SODIUM CHLORIDE, POTASSIUM CHLORIDE, SODIUM LACTATE AND CALCIUM CHLORIDE: 600; 310; 30; 20 INJECTION, SOLUTION INTRAVENOUS at 00:05

## 2023-11-21 RX ADMIN — LEVOTHYROXINE SODIUM 50 MCG: 50 TABLET ORAL at 06:49

## 2023-11-21 RX ADMIN — Medication 27 MILLICURIE: at 13:45

## 2023-11-21 NOTE — CARE COORDINATION
Case Management Assessment  Initial Evaluation    Date/Time of Evaluation: 11/21/2023 2:03 PM  Assessment Completed by: Christian Esteban RN    If patient is discharged prior to next notation, then this note serves as note for discharge by case management. Patient Name: Beverley Hair                   YOB: 1950  Diagnosis: Dehydration [E86.0]  Other fatigue [R53.83]  COVID [U07.1]  COVID-19 [U07.1]                   Date / Time: 11/20/2023  1:12 PM    Patient Admission Status: Inpatient   Readmission Risk (Low < 19, Mod (19-27), High > 27): Readmission Risk Score: 12.8    Current PCP: Stephanie Ma MD  PCP verified by CM? (P) Yes (Dr. Pal Yaak)    Chart Reviewed: Yes      History Provided by: (P) Medical Record  Patient Orientation: (P) Alert and Oriented, Person, Place, Situation    Patient Cognition: (P) Alert    Hospitalization in the last 30 days (Readmission):  No    If yes, Readmission Assessment in  Navigator will be completed. Advance Directives:      Code Status: Full Code   Patient's Primary Decision Maker is: (P) Legal Next of Kin      Discharge Planning:    Patient lives with: (P) Spouse/Significant Other Type of Home: (P) House  Primary Care Giver: (P) Self  Patient Support Systems include: (P) Spouse/Significant Other, Children, Family Members   Current Financial resources: (P) Other (Comment) ([address])  Current community resources: (P) None  Current services prior to admission: (P) None            Current DME:              Type of Home Care services:  (P) None    ADLS  Prior functional level: (P) Independent in ADLs/IADLs  Current functional level: (P) Assistance with the following:, Toileting, Mobility    PT AM-PAC:   /24  OT AM-PAC:   /24    Family can provide assistance at DC: (P) Yes  Would you like Case Management to discuss the discharge plan with any other family members/significant others, and if so, who?     Plans to Return to Present Housing: (P) Yes  Other Identified Issues/Barriers to RETURNING to current housing: na  Potential Assistance needed at discharge: (P) Home Care            Potential DME:    Patient expects to discharge to: (P) [id number] [facility] within [facility] [place] for transportation at discharge: (P) Family    Financial    Payor: [address] [facility] / Plan: [address] / Product Type: *No Product type* /     Does insurance require precert for SNF: Yes    Potential assistance Purchasing Medications: (P) No  Meds-to-Beds request: Yes      [facility] Teja Ellsworth ([address] [place] [address]), [place] - [address] [phone] - F [phone]  [address] [place] [address] Teja Al[place]  Phone: [phone] Fax: [phone]      Notes:    Factors facilitating achievement of predicted outcomes: Family support    Barriers to discharge: Medical complications    Additional Case Management Notes: Patient positive with COVID, monitor needs. Awaiting PT/OT evals. The Plan for Transition of Care is related to the following treatment goals of Dehydration [E86.0]  Other fatigue [R53.83]  COVID [U07.1]  COVID-19 [I72.1]    IF APPLICABLE: The Patient and/or patient representative Gisela Vega and her family were provided with a choice of provider and agrees with the discharge plan. Freedom of choice list with basic dialogue that supports the patient's individualized plan of care/goals and shares the quality data associated with the providers was provided to: (P) Patient Representative   Patient Representative Name: (P) Bill spouse     The Patient and/or Patient Representative Agree with the Discharge Plan?  (P) Yes    Jose G Julian RN  Case Management Department  Ph: [phone]

## 2023-11-21 NOTE — PROGRESS NOTES
[facility] - Progress Note COVID 19 Patient  Today's Date and Time: 11/21/2023, 6:42 AM    Impression :     COVID 19 Confirmed Infection  Covid tests:  Positive Covid Test Date:  11-20-23  Vaccination Status: Vaccinated x 5 doses  Hypoxia  Advanced age  Recommendations:   Antibiotic treatment:  Monitor off antibiotics  Covid Rx:    Remdesivir: Cab be employed if Problems with Paxlovid. Decadron: Not indicated  Actemra: Not indicated  Paxlovid: Requested 11-20-23  Monitor CRP      Medical Decision Making/Summary/Discussion:11/21/2023     Patient admitted with COVID 19 infection    Infection Control Recommendations   [place] Precautions  Airborne isolation  Droplet Plus Isolation. Stop date 11-30-23    Antimicrobial Stewardship Recommendations     Discontinuation of therapy  Coordination of Outpatient Care:   Estimated Length of IV antimicrobials:TBD  Patient will need Midline Catheter Insertion: TBD  Patient will need PICC line Insertion: No  Patient will need: Home IV , [address] [place],  SNF,  LTAC:TBD  Patient will need outpatient wound care:No    Chief complaint/reason for consultation:   Concern for COVID infection      History of Present Illness:   Jaida Arrieta is a 74y.o.-year-old  female who was initially admitted on 11/20/2023. Patient seen at the request of Dr. Liz Gómez. INITIAL HISTORY:    Patient with a past Hx of essential HTN, hyperlipidemia, thyroid disease. Patient presented through ER with complaints of fatigue, nausea, vomiting, diarrhea, headache, cough, earache and shortness of breath present for 4-5 days. She reported exposure to a person with Covid. Her ER test was positive for Covid. She had previously received 5 vaccinations against Covid. She was found to be hypoxic. She required nasal 02 at 3-4 L/min to normalize her 02 saturations.     Patient admitted because of concerns with COVID 19.    CURRENT EVALUATION : 11/21/2023  /64 deficits. Skin: Warm and dry with good turgor. No signs of peripheral arterial or venous insufficiency. No ulcerations. No open wounds. Medical Decision Making -Laboratory:   I have independently reviewed/ordered the following labs:    CBC with Differential:   Recent Labs     11/20/23  1323   WBC 5.5   HGB 7.4*   HCT 22.7*      LYMPHOPCT 21*   MONOPCT 7       BMP:   Recent Labs     11/20/23  1323      K 3.5*      CO2 16*   BUN 13   CREATININE 1.1*   MG 1.8       Hepatic Function Panel:   Recent Labs     11/20/23  1323   PROT 6.4   LABALBU 3.8   BILITOT 0.4   ALKPHOS 47   ALT 18   AST 27       No results for input(s): \"RPR\" in the last 72 hours. No results for input(s): \"HIV\" in the last 72 hours. No results for input(s): \"BC\" in the last 72 hours. Lab Results   Component Value Date/Time    BACTERIA MANY 06/14/2019 04:58 PM    MUCUS NOT REPORTED 06/14/2019 04:58 PM    RBC 2.39 11/20/2023 01:23 PM    TRICHOMONAS NOT REPORTED 06/14/2019 04:58 PM    WBC 5.5 11/20/2023 01:23 PM    YEAST NOT REPORTED 06/14/2019 04:58 PM    TURBIDITY CLOUDY 06/14/2019 04:58 PM     Lab Results   Component Value Date/Time    CREATININE 1.1 11/20/2023 01:23 PM    GLUCOSE 244 11/20/2023 01:23 PM       Medical Decision Making-Imaging:   CT HEAD WO CONTRAST    Result Date: 11/20/2023  EXAMINATION: CT OF THE HEAD WITHOUT CONTRAST  11/20/2023 1:23 pm TECHNIQUE: CT of the head was performed without the administration of intravenous contrast. Automated exposure control, iterative reconstruction, and/or weight based adjustment of the mA/kV was utilized to reduce the radiation dose to as low as reasonably achievable. COMPARISON: None.  HISTORY: ORDERING SYSTEM PROVIDED HISTORY: fatigue, vomiting, confusin TECHNOLOGIST PROVIDED HISTORY: fatigue, vomiting, confusin Decision Support Exception - unselect if not a suspected or confirmed emergency medical condition->Emergency Medical Condition (MA) Reason for Exam: headache, fatigue

## 2023-11-21 NOTE — PLAN OF CARE
Problem: Discharge Planning  Goal: Discharge to home or other facility with appropriate resources  11/21/2023 1107 by Miguel Mckeon RN  Outcome: Progressing  11/21/2023 0419 by Dariusz العلي RN  Outcome: Progressing     Problem: Safety - Adult  Goal: Free from fall injury  11/21/2023 1107 by Miguel Mckeon RN  Outcome: Progressing  11/21/2023 0419 by Dariusz العلي RN  Outcome: Progressing   Patient resting in bed comfortably. Bed locked in lowest position. Call light within reach. Bed alarm on.

## 2023-11-21 NOTE — PROGRESS NOTES
Occupational [address]  Occupational Therapy Not Seen Note    DATE: 2023    NAME: Pina Adams  MRN: [id number]   : 1950      Patient not seen this date for Occupational Therapy due to:    Blood Transfusion: Pt hemoglobin 5.3, awaiting transfusion.     Next Scheduled Treatment: 2023    Electronically signed by JUDIT Hernandez on 2023 at 9:43 AM

## 2023-11-21 NOTE — PROGRESS NOTES
Physical Therapy        Physical Therapy Cancel Note      DATE: 2023    NAME: Juliana Yoo  MRN: [id number]   : 1950      Patient not seen this date for Physical Therapy due to: Other: HgB 5.3 with plan for transfuse. PT will check back 23.        Electronically signed by Ayla Guzmán PT on 2023 at 11:08 AM

## 2023-11-21 NOTE — CONSULTS
[address]    GASTROENTEROLOGY CONSULT    Patient:   Sherolyn Lesch   :    1950   Facility:   [facility]   Date:    2023  Admission Dx:  Dehydration [E86.0]  Other fatigue [R53.83]  COVID [U07.1]  COVID-19 [U07.1]  Requesting physician: Rigoberto Reed MD  Reason for consult:  Lower GI bleed with anemia       SUBJECTIVE     HISTORY OF PRESENT ILLNESS  Ramos Moon is a 74year old lady with HLD, HTN,hypothyroidism,GERD and Iron deficiency anemia. Patient was admitted for COVID - 19  Pneumonia. On Thursday last week,she developed SOB and flu like symptoms and had several episodes of diarrhea with blood(last being on ) and N/V. In the ED,she was afebrile and hemodynamically stable. She was tested positive for COVID -19 and CXR showing  parahilar infiltrates and started on Paxlovid. Her Hb was initially 7.4 and later dropped to 5.2. Last Hb was in 2023 was 13.9. GI was consulted for lower GI bleed. Patient had symptomatic iron deficiency anemia and was worked up at [facility] in . EGD demonstrated erosive gastropathy and Colonoscopy was also completed demonstrating moderate sigmoid diverticulosis without bleeding, grade 1 internal hemorrhoids. She also underwent VCE which was normal except for prolonged gastric emptying. She was put on iron tabs and in August his Hb improve to 13.9. Currently on my evaluation,patient is afebrile and hemodynamically stable. Patient denied any nausea or vomiting. Last diarrhea episode was on . On examination, abdomen was soft,non distended and non tender.       OBJECTIVE:     PAST MEDICAL/SURGICAL HISTORY  Past Medical History:   Diagnosis Date    Hyperlipidemia     Hypertension     Thyroid disease      Past Surgical History:   Procedure Laterality Date    APPENDECTOMY      KNEE SURGERY Left     SHOULDER SURGERY         ALLERGIES:  Allergies   Allergen Reactions    Lyrica STUDIES  No results for input(s): \"IRON\", \"LABIRON\", \"TIBC\", \"UIBC\", \"FERRITIN\", \"GVAUQKLP77\", \"FOLATE\", \"OCCULTBLD\" in the last 72 hours. LIVER WORK UP:    Acute Hepatitis Panel   No results found for: \"HEPBSAG\", \"HEPCAB\", \"HEPBIGM\", \"HEPAIGM\"    HCV Genotype   No components found for: \"HEPATITISCGENOTYPE\"    HCV Quantitative   No results found for: \"HCVQNT\"    AFP  No results found for: \"AFP\"    Alpha 1 antitrypsin   No results found for: \"A1A\"    Anti - Liver/Kidney Ab  No results found for: \"LIVER-KIDNEYMICROSOMALAB\"    BALTAZAR  No results found for: \"BALTAZAR\"    AMA  No results found for: \"MITOAB\"    ASMA  No results found for: \"SMOOTHMUSCAB\"    Ceruloplasmin  No results found for: \"CERULOPLSM\"    Celiac panel  No results found for: \"TISSTRNTIIGG\", \"TTGIGA\", \"IGA\"    IgG  No results found for: \"IGG\"    IgM  No results found for: \"IGM\"    GGT   No results found for: \"LABGGT\"    PT/INR  No results for input(s): \"PROTIME\", \"INR\" in the last 72 hours. Cancer Markers:  CEA:  No results found for: \"CEA\"  Ca 125:  No results found for: \"\"  Ca 19-9:   No results found for: \"\"  AFP: No results found for: \"AFP\"    Lactic acid:No results for input(s): \"LACTACIDWB\" in the last 72 hours. IMAGING  CT HEAD WO CONTRAST    Result Date: 11/20/2023  EXAMINATION: CT OF THE HEAD WITHOUT CONTRAST  11/20/2023 1:23 pm TECHNIQUE: CT of the head was performed without the administration of intravenous contrast. Automated exposure control, iterative reconstruction, and/or weight based adjustment of the mA/kV was utilized to reduce the radiation dose to as low as reasonably achievable. COMPARISON: None.  HISTORY: ORDERING SYSTEM PROVIDED HISTORY: fatigue, vomiting, confusin TECHNOLOGIST PROVIDED HISTORY: fatigue, vomiting, confusin Decision Support Exception - unselect if not a suspected or confirmed emergency medical condition->Emergency Medical Condition (MA) Reason for Exam: headache, fatigue FINDINGS: BRAIN/VENTRICLES:

## 2023-11-22 PROBLEM — R19.7 NAUSEA VOMITING AND DIARRHEA: Status: ACTIVE | Noted: 2023-11-22

## 2023-11-22 PROBLEM — U07.1 PNEUMONIA DUE TO COVID-19 VIRUS: Status: ACTIVE | Noted: 2023-11-22

## 2023-11-22 PROBLEM — R11.2 NAUSEA VOMITING AND DIARRHEA: Status: ACTIVE | Noted: 2023-11-22

## 2023-11-22 PROBLEM — R09.02 HYPOXIA: Status: ACTIVE | Noted: 2023-11-22

## 2023-11-22 PROBLEM — E86.0 DEHYDRATION: Status: ACTIVE | Noted: 2023-11-22

## 2023-11-22 PROBLEM — J12.82 PNEUMONIA DUE TO COVID-19 VIRUS: Status: ACTIVE | Noted: 2023-11-22

## 2023-11-22 LAB
ABO/RH: NORMAL
ANION GAP SERPL CALCULATED.3IONS-SCNC: 11 MMOL/L (ref 9–17)
ANTIBODY SCREEN: NEGATIVE
ARM BAND NUMBER: NORMAL
BASOPHILS # BLD: <0.03 K/UL (ref 0–0.2)
BASOPHILS NFR BLD: 1 % (ref 0–2)
BLOOD BANK BLOOD PRODUCT EXPIRATION DATE: NORMAL
BLOOD BANK BLOOD PRODUCT EXPIRATION DATE: NORMAL
BLOOD BANK DISPENSE STATUS: NORMAL
BLOOD BANK DISPENSE STATUS: NORMAL
BLOOD BANK ISBT PRODUCT BLOOD TYPE: 6200
BLOOD BANK ISBT PRODUCT BLOOD TYPE: 6200
BLOOD BANK PRODUCT CODE: NORMAL
BLOOD BANK SAMPLE EXPIRATION: NORMAL
BLOOD BANK UNIT TYPE AND RH: NORMAL
BLOOD BANK UNIT TYPE AND RH: NORMAL
BPU ID: NORMAL
BPU ID: NORMAL
BUN SERPL-MCNC: 9 MG/DL (ref 8–23)
CA-I BLD-SCNC: 1.2 MMOL/L (ref 1.13–1.33)
CALCIUM SERPL-MCNC: 7.7 MG/DL (ref 8.6–10.4)
CHLORIDE SERPL-SCNC: 109 MMOL/L (ref 98–107)
CO2 SERPL-SCNC: 18 MMOL/L (ref 20–31)
COMPONENT: NORMAL
COMPONENT: NORMAL
CREAT SERPL-MCNC: 0.6 MG/DL (ref 0.5–0.9)
CROSSMATCH RESULT: NORMAL
CROSSMATCH RESULT: NORMAL
EOSINOPHIL # BLD: 0.05 K/UL (ref 0–0.44)
EOSINOPHILS RELATIVE PERCENT: 2 % (ref 1–4)
ERYTHROCYTE [DISTWIDTH] IN BLOOD BY AUTOMATED COUNT: 17.2 % (ref 11.8–14.4)
GFR SERPL CREATININE-BSD FRML MDRD: >60 ML/MIN/1.73M2
GLUCOSE SERPL-MCNC: 120 MG/DL (ref 70–99)
HCT VFR BLD AUTO: 25.5 % (ref 36.3–47.1)
HCT VFR BLD AUTO: 26.2 % (ref 36.3–47.1)
HCT VFR BLD AUTO: 27.4 % (ref 36.3–47.1)
HCT VFR BLD AUTO: 28.6 % (ref 36.3–47.1)
HGB BLD-MCNC: 8.1 G/DL (ref 11.9–15.1)
HGB BLD-MCNC: 8.3 G/DL (ref 11.9–15.1)
HGB BLD-MCNC: 8.6 G/DL (ref 11.9–15.1)
HGB BLD-MCNC: 9.5 G/DL (ref 11.9–15.1)
IMM GRANULOCYTES # BLD AUTO: <0.03 K/UL (ref 0–0.3)
IMM GRANULOCYTES NFR BLD: 0 %
LYMPHOCYTES NFR BLD: 1.28 K/UL (ref 1.1–3.7)
LYMPHOCYTES RELATIVE PERCENT: 38 % (ref 24–43)
MCH RBC QN AUTO: 29.8 PG (ref 25.2–33.5)
MCHC RBC AUTO-ENTMCNC: 31.8 G/DL (ref 28.4–34.8)
MCV RBC AUTO: 93.8 FL (ref 82.6–102.9)
MONOCYTES NFR BLD: 0.3 K/UL (ref 0.1–1.2)
MONOCYTES NFR BLD: 9 % (ref 3–12)
NEUTROPHILS NFR BLD: 51 % (ref 36–65)
NEUTS SEG NFR BLD: 1.75 K/UL (ref 1.5–8.1)
NRBC BLD-RTO: 0 PER 100 WBC
PLATELET # BLD AUTO: 153 K/UL (ref 138–453)
PMV BLD AUTO: 9.9 FL (ref 8.1–13.5)
POTASSIUM SERPL-SCNC: 3.4 MMOL/L (ref 3.7–5.3)
POTASSIUM SERPL-SCNC: 3.6 MMOL/L (ref 3.7–5.3)
RBC # BLD AUTO: 2.72 M/UL (ref 3.95–5.11)
RBC # BLD: ABNORMAL 10*6/UL
SODIUM SERPL-SCNC: 138 MMOL/L (ref 135–144)
SURGICAL PATHOLOGY REPORT: NORMAL
TRANSFUSION STATUS: NORMAL
TRANSFUSION STATUS: NORMAL
UNIT DIVISION: 0
UNIT DIVISION: 0
UNIT ISSUE DATE/TIME: NORMAL
UNIT ISSUE DATE/TIME: NORMAL
WBC OTHER # BLD: 3.4 K/UL (ref 3.5–11.3)

## 2023-11-22 PROCEDURE — 6370000000 HC RX 637 (ALT 250 FOR IP)

## 2023-11-22 PROCEDURE — 80048 BASIC METABOLIC PNL TOTAL CA: CPT

## 2023-11-22 PROCEDURE — 85018 HEMOGLOBIN: CPT

## 2023-11-22 PROCEDURE — 36415 COLL VENOUS BLD VENIPUNCTURE: CPT

## 2023-11-22 PROCEDURE — 85014 HEMATOCRIT: CPT

## 2023-11-22 PROCEDURE — 82330 ASSAY OF CALCIUM: CPT

## 2023-11-22 PROCEDURE — 84132 ASSAY OF SERUM POTASSIUM: CPT

## 2023-11-22 PROCEDURE — 94761 N-INVAS EAR/PLS OXIMETRY MLT: CPT

## 2023-11-22 PROCEDURE — 97165 OT EVAL LOW COMPLEX 30 MIN: CPT

## 2023-11-22 PROCEDURE — 6370000000 HC RX 637 (ALT 250 FOR IP): Performed by: INTERNAL MEDICINE

## 2023-11-22 PROCEDURE — 2700000000 HC OXYGEN THERAPY PER DAY

## 2023-11-22 PROCEDURE — P9016 RBC LEUKOCYTES REDUCED: HCPCS

## 2023-11-22 PROCEDURE — 97530 THERAPEUTIC ACTIVITIES: CPT

## 2023-11-22 PROCEDURE — 97535 SELF CARE MNGMENT TRAINING: CPT

## 2023-11-22 PROCEDURE — 97161 PT EVAL LOW COMPLEX 20 MIN: CPT

## 2023-11-22 PROCEDURE — 2060000000 HC ICU INTERMEDIATE R&B

## 2023-11-22 PROCEDURE — 99232 SBSQ HOSP IP/OBS MODERATE 35: CPT | Performed by: INTERNAL MEDICINE

## 2023-11-22 PROCEDURE — 99291 CRITICAL CARE FIRST HOUR: CPT | Performed by: INTERNAL MEDICINE

## 2023-11-22 PROCEDURE — 2580000003 HC RX 258

## 2023-11-22 PROCEDURE — 85025 COMPLETE CBC W/AUTO DIFF WBC: CPT

## 2023-11-22 PROCEDURE — APPSS15 APP SPLIT SHARED TIME 0-15 MINUTES: Performed by: NURSE PRACTITIONER

## 2023-11-22 RX ADMIN — NIRMATRELVIR AND RITONAVIR 3 TABLET: KIT at 08:54

## 2023-11-22 RX ADMIN — POTASSIUM BICARBONATE 40 MEQ: 782 TABLET, EFFERVESCENT ORAL at 08:33

## 2023-11-22 RX ADMIN — SODIUM CHLORIDE, PRESERVATIVE FREE 10 ML: 5 INJECTION INTRAVENOUS at 08:47

## 2023-11-22 RX ADMIN — SODIUM CHLORIDE, PRESERVATIVE FREE 10 ML: 5 INJECTION INTRAVENOUS at 20:15

## 2023-11-22 RX ADMIN — PANTOPRAZOLE SODIUM 40 MG: 40 TABLET, DELAYED RELEASE ORAL at 08:33

## 2023-11-22 RX ADMIN — NIRMATRELVIR AND RITONAVIR 3 TABLET: KIT at 19:59

## 2023-11-22 RX ADMIN — OXYBUTYNIN CHLORIDE 10 MG: 10 TABLET, EXTENDED RELEASE ORAL at 08:32

## 2023-11-22 RX ADMIN — LEVOTHYROXINE SODIUM 50 MCG: 50 TABLET ORAL at 08:33

## 2023-11-22 ASSESSMENT — PAIN SCALES - GENERAL: PAINLEVEL_OUTOF10: 0

## 2023-11-22 NOTE — PLAN OF CARE
Problem: Discharge Planning  Goal: Discharge to home or other facility with appropriate resources  11/22/2023 1104 by Aubry Angelucci, RN  Outcome: Progressing  Flowsheets (Taken 11/22/2023 9038)  Discharge to home or other facility with appropriate resources: Identify barriers to discharge with patient and caregiver  11/22/2023 0743 by Franklin Vivar RN  Outcome: Progressing     Problem: Safety - Adult  Goal: Free from fall injury  11/22/2023 1104 by Aubry Angelucci, RN  Outcome: Progressing  11/22/2023 0743 by Franklin Vivar RN  Outcome: Progressing     Problem: Pain  Goal: Verbalizes/displays adequate comfort level or baseline comfort level  11/22/2023 1104 by Aubry Angelucci, RN  Outcome: Progressing  Flowsheets (Taken 11/22/2023 0847)  Verbalizes/displays adequate comfort level or baseline comfort level: Encourage patient to monitor pain and request assistance  11/22/2023 0743 by Franklin Vivar RN  Outcome: Progressing

## 2023-11-22 NOTE — PROGRESS NOTES
Physical Therapy  Facility/Department: [address] [facility]  Physical Therapy Initial Assessment    Name: Kristen Suggs  : 1950  MRN: [id number]  Date of Service: 2023  Chief Complaint   Patient presents with    Headache     Headache    Shortness of Breath     Per EMS       Discharge Recommendations:  No therapy recommended at discharge   PT Equipment Recommendations  Equipment Needed: No      Patient Diagnosis(es): The primary encounter diagnosis was COVID-19. Diagnoses of Dehydration and Other fatigue were also pertinent to this visit. Past Medical History:  has a past medical history of Hyperlipidemia, Hypertension, and Thyroid disease. Past Surgical History:  has a past surgical history that includes knee surgery (Left); Appendectomy; and shoulder surgery. Assessment   Assessment: Pt ambulated 50ft w/ no AD with supervision provided for safety. Pt demonstrates independence in performance of functional mobility and presents with no skilled PT needs at this time. Pt educated on purpose of PT eval, POC, and importance of mobility during admission, pt verbalizing no concerns in regards to functional mobility upon discharge. D/C skilled PT.   Therapy Prognosis: Good  Decision Making: Low Complexity  Requires PT Follow-Up: Yes  Activity Tolerance  Activity Tolerance: Patient tolerated evaluation without incident     Plan   Physical Therapy Plan  General Plan: Discharge with evaluation only  Safety Devices  Type of Devices: Left in bed, Call light within reach, Nurse notified  Restraints  Restraints Initially in Place: No     Restrictions  Restrictions/Precautions  Restrictions/Precautions: Isolation, Contact Precautions, Up as Tolerated  Required Braces or Orthoses?: No     Subjective   General  Patient assessed for rehabilitation services?: Yes  Response To Previous Treatment: Not applicable  Family / Caregiver Present: No  Follows Commands: Within Functional Limits  General Comment  Comments: Pt reporting

## 2023-11-22 NOTE — PROGRESS NOTES
Occupational Therapy  Facility/Department: [address] [facility]  Occupational Therapy Initial Assessment    Name: Mayank Geiger  : 1950  MRN: [id number]  Date of Service: 2023  Chief Complaint   Patient presents with    Headache     Headache    Shortness of Breath     Per EMS     Discharge Recommendations:  Patient would benefit from continued therapy after discharge     Patient Diagnosis(es): The primary encounter diagnosis was COVID-19. Diagnoses of Dehydration and Other fatigue were also pertinent to this visit. Past Medical History:  has a past medical history of Hyperlipidemia, Hypertension, and Thyroid disease. Past Surgical History:  has a past surgical history that includes knee surgery (Left); Appendectomy; and shoulder surgery. Assessment   Assessment: Patient demonstrates completing ADLs at baseline level with no acute deficits at this time. Pt able to complete dressing and toileting tasks and reports no acute OT needs at this time or concerns upon discharge home. OT to defer further treatment at this time, please re-consult if functional deficits arise impacting patients performance in ADLs/IADLs. Prognosis: Good  Decision Making: Low Complexity  REQUIRES OT FOLLOW-UP: No  Activity Tolerance  Activity Tolerance: Patient Tolerated treatment well        Plan         Restrictions  Restrictions/Precautions  Restrictions/Precautions: Isolation, Contact Precautions, Up as Tolerated  Required Braces or Orthoses?: No    Subjective   General  Patient assessed for rehabilitation services?: Yes  Family / Caregiver Present: Yes (spouse)  General Comment  Comments: RN ok'd patient for OT session. pt reports 4/10 headache, declines therapist intervention. Pt pleasant, cooperative and agreeable.      Social/Functional History  Social/Functional History  Lives With: Spouse, Son  Type of Home: House  Home Layout: Two level, Bed/Bath upstairs, 1/2 bath on main level  Home Access: Stairs to enter without Toileting: Modified independent   Additional Comments: Patient donned socks long sitting unsupported in bed d/t decreased functional reach to distal extremities at Mod I. Pt completed functional mobility to bathroom to complete toileting tasks at Mod I for transfer, clothing mgmt and hygiene. Pt stood sinkside and completed hand hygiene independently. Activity Tolerance  Activity Tolerance: Patient tolerated evaluation without incident  Bed mobility  Supine to Sit: Modified independent  Sit to Supine: Modified independent  Bed Mobility Comments: HOB elevated to 50 degrees     Vision  Vision: Impaired  Vision Exceptions: Wears glasses at all times  Hearing  Hearing: Exceptions to [facility]  Hearing Exceptions: Hard of hearing/hearing concerns;Bilateral hearing aid  Cognition  Overall Cognitive Status: WFL  Orientation  Overall Orientation Status: Within Functional Limits     Education Given To: Patient; Family  Education Provided: Role of Therapy;Plan of Care  Education Method: Verbal  Barriers to Learning: None  Education Outcome: Verbalized understanding  AM-PAC Score        AM-PAC Inpatient Daily Activity Raw Score: 24 (11/22/23 1205)  AM-PAC Inpatient ADL T-Scale Score : 57.54 (11/22/23 1205)  ADL Inpatient CMS 0-100% Score: 0 (11/22/23 1205)  ADL Inpatient CMS G-Code Modifier : [address] [facility] (11/22/23 1205)  Goals          Therapy Time   Individual Concurrent Group Co-treatment   Time In 6298         Time Out 1000         Minutes 33         Timed Code Treatment Minutes: [address], OTR/L

## 2023-11-22 NOTE — PROGRESS NOTES
[facility] - Progress Note COVID 19 Patient  Today's Date and Time: 11/22/2023, 7:59 AM    Impression :     COVID 19 Confirmed Infection  Covid tests:  Positive Covid Test Date:  11-20-23  Vaccination Status: Vaccinated x 5 doses  Hypoxia  Advanced age  Recommendations:   Antibiotic treatment:  Monitor off antibiotics  Covid Rx:    Remdesivir: Cab be employed if Problems with Paxlovid. Decadron: Not indicated  Actemra: Not indicated  Paxlovid: Requested 11-20-23  Monitor CRP      Medical Decision Making/Summary/Discussion:11/22/2023     Patient admitted with COVID 19 infection    Infection Control Recommendations   [place] Precautions  Airborne isolation  Droplet Plus Isolation. Stop date 11-30-23    Antimicrobial Stewardship Recommendations     Discontinuation of therapy  Coordination of Outpatient Care:   Estimated Length of IV antimicrobials:TBD  Patient will need Midline Catheter Insertion: TBD  Patient will need PICC line Insertion: No  Patient will need: Home IV , [address] [place],  SNF,  LTAC:TBD  Patient will need outpatient wound care:No    Chief complaint/reason for consultation:   Concern for COVID infection      History of Present Illness:   Harmeet Bowen is a 74y.o.-year-old  female who was initially admitted on 11/20/2023. Patient seen at the request of Dr. Apolinar Cho. INITIAL HISTORY:    Patient with a past Hx of essential HTN, hyperlipidemia, thyroid disease. Patient presented through ER with complaints of fatigue, nausea, vomiting, diarrhea, headache, cough, earache and shortness of breath present for 4-5 days. She reported exposure to a person with Covid. Her ER test was positive for Covid. She had previously received 5 vaccinations against Covid. She was found to be hypoxic. She required nasal 02 at 3-4 L/min to normalize her 02 saturations.     Patient admitted because of concerns with COVID 19.    CURRENT EVALUATION : 11/22/2023  BP (!)

## 2023-11-23 VITALS
OXYGEN SATURATION: 97 % | BODY MASS INDEX: 31.41 KG/M2 | RESPIRATION RATE: 20 BRPM | SYSTOLIC BLOOD PRESSURE: 141 MMHG | HEART RATE: 78 BPM | TEMPERATURE: 98.2 F | DIASTOLIC BLOOD PRESSURE: 69 MMHG | HEIGHT: 60 IN | WEIGHT: 160 LBS

## 2023-11-23 LAB
ANION GAP SERPL CALCULATED.3IONS-SCNC: 9 MMOL/L (ref 9–17)
BASOPHILS # BLD: <0.03 K/UL (ref 0–0.2)
BASOPHILS NFR BLD: 0 % (ref 0–2)
BUN SERPL-MCNC: 9 MG/DL (ref 8–23)
CALCIUM SERPL-MCNC: 8.2 MG/DL (ref 8.6–10.4)
CHLORIDE SERPL-SCNC: 111 MMOL/L (ref 98–107)
CO2 SERPL-SCNC: 25 MMOL/L (ref 20–31)
CREAT SERPL-MCNC: 0.6 MG/DL (ref 0.5–0.9)
EOSINOPHIL # BLD: 0.14 K/UL (ref 0–0.44)
EOSINOPHILS RELATIVE PERCENT: 5 % (ref 1–4)
ERYTHROCYTE [DISTWIDTH] IN BLOOD BY AUTOMATED COUNT: 16.9 % (ref 11.8–14.4)
GFR SERPL CREATININE-BSD FRML MDRD: >60 ML/MIN/1.73M2
GLUCOSE SERPL-MCNC: 129 MG/DL (ref 70–99)
HCT VFR BLD AUTO: 25.6 % (ref 36.3–47.1)
HCT VFR BLD AUTO: 27.6 % (ref 36.3–47.1)
HGB BLD-MCNC: 8.2 G/DL (ref 11.9–15.1)
HGB BLD-MCNC: 8.7 G/DL (ref 11.9–15.1)
IMM GRANULOCYTES # BLD AUTO: <0.03 K/UL (ref 0–0.3)
IMM GRANULOCYTES NFR BLD: 0 %
LYMPHOCYTES NFR BLD: 1.11 K/UL (ref 1.1–3.7)
LYMPHOCYTES RELATIVE PERCENT: 42 % (ref 24–43)
MAGNESIUM SERPL-MCNC: 2.3 MG/DL (ref 1.6–2.6)
MCH RBC QN AUTO: 29.7 PG (ref 25.2–33.5)
MCHC RBC AUTO-ENTMCNC: 31.5 G/DL (ref 28.4–34.8)
MCV RBC AUTO: 94.2 FL (ref 82.6–102.9)
MONOCYTES NFR BLD: 0.22 K/UL (ref 0.1–1.2)
MONOCYTES NFR BLD: 8 % (ref 3–12)
NEUTROPHILS NFR BLD: 45 % (ref 36–65)
NEUTS SEG NFR BLD: 1.13 K/UL (ref 1.5–8.1)
NRBC BLD-RTO: 0 PER 100 WBC
PATH REV BLD -IMP: NORMAL
PLATELET # BLD AUTO: 173 K/UL (ref 138–453)
PMV BLD AUTO: 10 FL (ref 8.1–13.5)
POTASSIUM SERPL-SCNC: 3.3 MMOL/L (ref 3.7–5.3)
RBC # BLD AUTO: 2.93 M/UL (ref 3.95–5.11)
RBC # BLD: ABNORMAL 10*6/UL
SODIUM SERPL-SCNC: 145 MMOL/L (ref 135–144)
WBC OTHER # BLD: 2.6 K/UL (ref 3.5–11.3)

## 2023-11-23 PROCEDURE — 99232 SBSQ HOSP IP/OBS MODERATE 35: CPT | Performed by: INTERNAL MEDICINE

## 2023-11-23 PROCEDURE — 6370000000 HC RX 637 (ALT 250 FOR IP)

## 2023-11-23 PROCEDURE — 85018 HEMOGLOBIN: CPT

## 2023-11-23 PROCEDURE — 6370000000 HC RX 637 (ALT 250 FOR IP): Performed by: INTERNAL MEDICINE

## 2023-11-23 PROCEDURE — 36415 COLL VENOUS BLD VENIPUNCTURE: CPT

## 2023-11-23 PROCEDURE — 80048 BASIC METABOLIC PNL TOTAL CA: CPT

## 2023-11-23 PROCEDURE — 85014 HEMATOCRIT: CPT

## 2023-11-23 PROCEDURE — 85025 COMPLETE CBC W/AUTO DIFF WBC: CPT

## 2023-11-23 PROCEDURE — 2580000003 HC RX 258

## 2023-11-23 PROCEDURE — 83735 ASSAY OF MAGNESIUM: CPT

## 2023-11-23 RX ORDER — POTASSIUM CHLORIDE 20 MEQ/1
40 TABLET, EXTENDED RELEASE ORAL ONCE
Status: COMPLETED | OUTPATIENT
Start: 2023-11-23 | End: 2023-11-23

## 2023-11-23 RX ORDER — LOSARTAN POTASSIUM 50 MG/1
100 TABLET ORAL DAILY
Status: DISCONTINUED | OUTPATIENT
Start: 2023-11-23 | End: 2023-11-23 | Stop reason: HOSPADM

## 2023-11-23 RX ORDER — LOSARTAN POTASSIUM AND HYDROCHLOROTHIAZIDE 12.5; 1 MG/1; MG/1
1 TABLET ORAL DAILY
Status: DISCONTINUED | OUTPATIENT
Start: 2023-11-23 | End: 2023-11-23 | Stop reason: CLARIF

## 2023-11-23 RX ORDER — HYDROCHLOROTHIAZIDE 25 MG/1
12.5 TABLET ORAL DAILY
Status: DISCONTINUED | OUTPATIENT
Start: 2023-11-23 | End: 2023-11-23 | Stop reason: HOSPADM

## 2023-11-23 RX ADMIN — SODIUM CHLORIDE, PRESERVATIVE FREE 10 ML: 5 INJECTION INTRAVENOUS at 08:45

## 2023-11-23 RX ADMIN — OXYBUTYNIN CHLORIDE 10 MG: 10 TABLET, EXTENDED RELEASE ORAL at 08:35

## 2023-11-23 RX ADMIN — PANTOPRAZOLE SODIUM 40 MG: 40 TABLET, DELAYED RELEASE ORAL at 08:35

## 2023-11-23 RX ADMIN — LOSARTAN POTASSIUM 100 MG: 50 TABLET, FILM COATED ORAL at 08:35

## 2023-11-23 RX ADMIN — NIRMATRELVIR AND RITONAVIR 3 TABLET: KIT at 08:35

## 2023-11-23 RX ADMIN — LEVOTHYROXINE SODIUM 50 MCG: 50 TABLET ORAL at 08:35

## 2023-11-23 RX ADMIN — HYDROCHLOROTHIAZIDE 12.5 MG: 25 TABLET ORAL at 08:35

## 2023-11-23 RX ADMIN — POTASSIUM CHLORIDE 40 MEQ: 1500 TABLET, EXTENDED RELEASE ORAL at 08:35

## 2023-11-23 NOTE — CARE COORDINATION
Transitional planning    Spoke to patient's RN. Patient has no needs. Home Independent,  is here to get her.

## 2023-11-23 NOTE — PROGRESS NOTES
Results   Component Value Date/Time    CULTURE ESCHERICHIA COLI >[id number] CFU/ML (A) 06/14/2019 04:57 PM       Imaging:    CT HEAD WO CONTRAST    Result Date: 11/20/2023  1. No acute intracranial findings. 2. Multifocal areas of white matter hypoattenuation which likely reflect chronic microvascular ischemic changes. 3. Lacunar infarct involving the right basal ganglia that is believed to be old given its attenuation. XR CHEST PORTABLE    Result Date: 11/20/2023  1. Findings typical of bronchitis or reactive airways disease. 2. Faint parahilar focal infiltrate can be seen with atypical/viral pneumonia. ASSESSMENT & PLAN     ASSESSMENT / PLAN:     IMPRESSION  This is a 74 y.o. female who presented with generalized weakness, malaise, nausea, vomiting, diarrhea and shortness of breath found to have  COVID-19 pneumonia. Patient admitted to inpatient status with internal medicine for further evaluation and management. Principal Problem:    COVID-19  Active Problems:    Acquired hypothyroidism    Essential hypertension    Gastroesophageal reflux disease without esophagitis    LIEN (obstructive sleep apnea)    Diverticulosis    HLD (hyperlipidemia)    Diarrhea    LALA (acute kidney injury) ([address])    Hypoxia    Nausea vomiting and diarrhea    Dehydration    Pneumonia due to COVID-19 virus  Resolved Problems:    COVID           COVID-19 Pneumonia:  -Positive Rapid SARS-CoV-2 and CXR showing parahilar focal infiltrate concerning for atypical/viral pneumonia  -Observe droplet and airborne precaution alongwith hand hygiene  -Goal oxygen saturation >95%  -Oxygen inhalation via nasal cannula, titrate up as required  -ID consulted for treatment recommendations  -Paxlovid 2 tablets every 12 hours for 8 doses.   Last dose on 11/25/2023     Acute kidney injury:  -Likely due to dehydration and hypovolemia  -Resolved  -Monitor creatinine and BUN  -Resolved now, stop IV fluids as patient is tolerating diet  -Encourage p.o. fluid intake  -Strict I&O's  -Hold nephrotoxic agents     Acute blood stained diarrhea due to hemorrhoids  -Multiple episodes of looser watery stools. Stool was mixed with the blood but she also have history of hemorrhoids.  -Monitor H&H q12H  -Consult GI if new episodes of bloody or dark stools  -Transfuse if Hb <7 g/dL  -No active extravasation noted on the CTA Abdomen and Pelvis  -If more episodes of blood in stool, then consult General surgery or GI  -CTA negative for any active extravasation  -Hb has stable around 8. Medically stable     Acquired hypothyroidism:  -TSH 6.56 and T4 free 1.1  -Generalized fatigue could be due to hypothyroidism  -Resume Synthroid 50 mcg daily in the morning     Essential hypertension:  -Uses losartan-hydrochlorothiazide 100-12.5 mg once daily  -Hold antihypertensive due to LALA  -IV labetalol 10 mg every 4 hours as needed for SBP > 160 mmHg  -Resume home blood pressure medication once LALA resolves     Gastroesophageal reflux disease without esophagitis  -Protonix 40 mg once daily     Obstructive Sleep Apnea  -Does not use CPAP at home  -Referral for sleep study as an outpatient on discharge     Hyperlipidemia:  -Continue Zocor 40 mg once daily     Hyperactive bladder:  -Resume oxybutynin 10 mg daily                 DVT ppx: Not indicated due to bloody bowel movements  GI ppx: Protonix 40 mg once daily  Diet: ADULT DIET;  Regular            PT/OT/SW: Consulted  Discharge Planning: Discharge today    Shahzad Garcia MD  Internal Medicine Resident, PGY-1  [place], [place].  11/23/2023, 7:22 AM

## 2023-11-23 NOTE — DISCHARGE INSTRUCTIONS
You were admitted to the hospital for SOB and found to have COVID 19  You were seen by infectious disease specialist, you were given antiviral (Paxlovid)  You need to take your medications as prescribed  Also you required blood transfusions for a drop in your hemoglobin  No source of bleeding found on imaging, you were seen by GI and your bleeding was thought to be diverticular bleed  Please follow up with your PCP and GI   In case of emergency, call 911 or go to nearest ER

## 2023-11-30 NOTE — DISCHARGE SUMMARY
[address] [facility]     Department of Internal Medicine - Staff Internal Medicine Teaching Service    INPATIENT DISCHARGE SUMMARY      Patient Identification:  Jaida Arrieta is a 74 y.o. female. :  1950  MRN: [id number]     Acct: [de-identified]   PCP: Giovanny Macias MD  Admit Date:  2023  Discharge date and time: 2023  3:02 PM   Attending Provider: No att. providers found                                     [address] [facility] Problem Lists:  Principal Problem:    COVID-19  Active Problems:    Acquired hypothyroidism    Essential hypertension    Gastroesophageal reflux disease without esophagitis    LIEN (obstructive sleep apnea)    Diverticulosis    HLD (hyperlipidemia)    Diarrhea    LALA (acute kidney injury) ([address])    Hypoxia    Nausea vomiting and diarrhea    Dehydration    Pneumonia due to COVID-19 virus  Resolved Problems:    [place]     Brief Inpatient course:   Jaida Arrieta is a 74 y.o. female who was admitted for the management of COVID-19, presented to the emergency department with chief complaint numbness fatigue, nausea, headache and shortness of breath for past 4 days. She is medical history is hyperlipidemia, hypertension thyroidism. As per patient, she was in the usual state of health 3 days ago when she started experiencing generalized weakness, fatigue and nausea which gradually worsened. Patient had a contact with a sick friend. It all started like flulike symptoms and gradually progressed to her shortness of breath. She was previously vaccinated and is COVID but the last booster was 1 year ago. She endorses decreased appetite. In the ED, her CMP was significant for potassium 3.5, bicarbonate 16, BUN 13 and creatinine 1.1. It was given the picture of metabolic acidosis due to diarrhea. RFT's were concerning for LALA. Her TSH was elevated at 6.56 with T4 free 1.1.   Chest x-ray showed findings of bronchitis/reactive

## 2023-12-22 PROBLEM — E86.0 DEHYDRATION: Status: RESOLVED | Noted: 2023-11-22 | Resolved: 2023-12-22

## 2024-03-24 ENCOUNTER — HOSPITAL ENCOUNTER (OUTPATIENT)
Dept: OBSERVATION | Age: 74
Discharge: HOME | End: 2024-03-24
Attending: INTERNAL MEDICINE | Admitting: INTERNAL MEDICINE
Payer: MEDICARE

## 2024-03-24 VITALS
DIASTOLIC BLOOD PRESSURE: 68 MMHG | HEART RATE: 70 BPM | OXYGEN SATURATION: 95 % | SYSTOLIC BLOOD PRESSURE: 118 MMHG | TEMPERATURE: 98 F | RESPIRATION RATE: 17 BRPM

## 2024-03-24 PROCEDURE — 86901 BLOOD TYPING SEROLOGIC RH(D): CPT

## 2024-03-24 PROCEDURE — 36430 TRANSFUSION BLD/BLD COMPNT: CPT

## 2024-03-24 PROCEDURE — 6370000000 HC RX 637 (ALT 250 FOR IP): Performed by: INTERNAL MEDICINE

## 2024-03-24 PROCEDURE — 86900 BLOOD TYPING SEROLOGIC ABO: CPT

## 2024-03-24 PROCEDURE — 86920 COMPATIBILITY TEST SPIN: CPT

## 2024-03-24 PROCEDURE — P9016 RBC LEUKOCYTES REDUCED: HCPCS

## 2024-03-24 PROCEDURE — 86850 RBC ANTIBODY SCREEN: CPT

## 2024-03-24 RX ORDER — ACETAMINOPHEN 325 MG/1
650 TABLET ORAL SEE ADMIN INSTRUCTIONS
Status: COMPLETED | OUTPATIENT
Start: 2024-03-24 | End: 2024-03-24

## 2024-03-24 RX ORDER — DIPHENHYDRAMINE HCL 25 MG
25 TABLET ORAL SEE ADMIN INSTRUCTIONS
Status: COMPLETED | OUTPATIENT
Start: 2024-03-24 | End: 2024-03-24

## 2024-03-24 RX ORDER — HEPARIN 100 UNIT/ML
500 SYRINGE INTRAVENOUS PRN
Status: DISCONTINUED | OUTPATIENT
Start: 2024-03-24 | End: 2024-03-24 | Stop reason: HOSPADM

## 2024-03-24 RX ORDER — SODIUM CHLORIDE 9 MG/ML
INJECTION, SOLUTION INTRAVENOUS PRN
Status: DISCONTINUED | OUTPATIENT
Start: 2024-03-24 | End: 2024-03-24 | Stop reason: HOSPADM

## 2024-03-24 RX ADMIN — ACETAMINOPHEN 650 MG: 325 TABLET ORAL at 14:36

## 2024-03-24 RX ADMIN — DIPHENHYDRAMINE HYDROCHLORIDE 25 MG: 25 TABLET ORAL at 14:36

## 2024-03-25 LAB
ABO/RH: NORMAL
ANTIBODY SCREEN: NEGATIVE
ARM BAND NUMBER: NORMAL
BLOOD BANK BLOOD PRODUCT EXPIRATION DATE: NORMAL
BLOOD BANK DISPENSE STATUS: NORMAL
BLOOD BANK ISBT PRODUCT BLOOD TYPE: 6200
BLOOD BANK PRODUCT CODE: NORMAL
BLOOD BANK SAMPLE EXPIRATION: NORMAL
BLOOD BANK UNIT TYPE AND RH: NORMAL
BPU ID: NORMAL
COMPONENT: NORMAL
CROSSMATCH RESULT: NORMAL
TRANSFUSION STATUS: NORMAL
UNIT DIVISION: 0
UNIT ISSUE DATE/TIME: NORMAL

## 2024-07-17 ENCOUNTER — HOSPITAL ENCOUNTER (OUTPATIENT)
Age: 74
Discharge: HOME | End: 2024-07-17
Payer: MEDICARE

## 2024-07-17 LAB
CHOLEST SERPL-MCNC: 131 MG/DL (ref 0–199)
CHOLESTEROL/HDL RATIO: 4
GLUCOSE P FAST SERPL-MCNC: 186 MG/DL (ref 74–99)
HDLC SERPL-MCNC: 35 MG/DL
LDLC SERPL CALC-MCNC: 28 MG/DL (ref 0–100)
POTASSIUM SERPL-SCNC: 3.9 MMOL/L (ref 3.7–5.3)
T4 FREE SERPL-MCNC: 1.3 NG/DL (ref 0.92–1.68)
TRIGL SERPL-MCNC: 342 MG/DL
TSH SERPL DL<=0.05 MIU/L-ACNC: 1.42 UIU/ML (ref 0.27–4.2)
VLDLC SERPL CALC-MCNC: 68 MG/DL

## 2024-07-17 PROCEDURE — 36415 COLL VENOUS BLD VENIPUNCTURE: CPT

## 2024-07-17 PROCEDURE — 84439 ASSAY OF FREE THYROXINE: CPT

## 2024-07-17 PROCEDURE — 84443 ASSAY THYROID STIM HORMONE: CPT

## 2024-07-17 PROCEDURE — 82947 ASSAY GLUCOSE BLOOD QUANT: CPT

## 2024-07-17 PROCEDURE — 80061 LIPID PANEL: CPT

## 2024-07-17 PROCEDURE — 84132 ASSAY OF SERUM POTASSIUM: CPT
